# Patient Record
Sex: FEMALE | Race: WHITE | NOT HISPANIC OR LATINO
[De-identification: names, ages, dates, MRNs, and addresses within clinical notes are randomized per-mention and may not be internally consistent; named-entity substitution may affect disease eponyms.]

---

## 2017-07-18 ENCOUNTER — APPOINTMENT (OUTPATIENT)
Dept: MAMMOGRAPHY | Facility: IMAGING CENTER | Age: 47
End: 2017-07-18

## 2017-07-27 ENCOUNTER — FORM ENCOUNTER (OUTPATIENT)
Age: 47
End: 2017-07-27

## 2017-07-28 ENCOUNTER — APPOINTMENT (OUTPATIENT)
Dept: ULTRASOUND IMAGING | Facility: IMAGING CENTER | Age: 47
End: 2017-07-28
Payer: COMMERCIAL

## 2017-07-28 ENCOUNTER — APPOINTMENT (OUTPATIENT)
Dept: MAMMOGRAPHY | Facility: IMAGING CENTER | Age: 47
End: 2017-07-28
Payer: COMMERCIAL

## 2017-07-28 ENCOUNTER — OUTPATIENT (OUTPATIENT)
Dept: OUTPATIENT SERVICES | Facility: HOSPITAL | Age: 47
LOS: 1 days | End: 2017-07-28
Payer: COMMERCIAL

## 2017-07-28 DIAGNOSIS — Z00.8 ENCOUNTER FOR OTHER GENERAL EXAMINATION: ICD-10-CM

## 2017-07-28 DIAGNOSIS — R92.2 INCONCLUSIVE MAMMOGRAM: ICD-10-CM

## 2017-07-28 PROCEDURE — 76641 ULTRASOUND BREAST COMPLETE: CPT | Mod: 26,50

## 2017-07-28 PROCEDURE — 76830 TRANSVAGINAL US NON-OB: CPT | Mod: 26

## 2017-07-28 PROCEDURE — 76856 US EXAM PELVIC COMPLETE: CPT

## 2017-07-28 PROCEDURE — 76856 US EXAM PELVIC COMPLETE: CPT | Mod: 26

## 2017-07-28 PROCEDURE — G0202: CPT | Mod: 26

## 2017-07-28 PROCEDURE — 76830 TRANSVAGINAL US NON-OB: CPT

## 2017-07-28 PROCEDURE — 77063 BREAST TOMOSYNTHESIS BI: CPT | Mod: 26

## 2017-07-28 PROCEDURE — 76641 ULTRASOUND BREAST COMPLETE: CPT

## 2017-07-28 PROCEDURE — 77063 BREAST TOMOSYNTHESIS BI: CPT

## 2017-07-28 PROCEDURE — 77067 SCR MAMMO BI INCL CAD: CPT

## 2017-09-12 ENCOUNTER — APPOINTMENT (OUTPATIENT)
Dept: OBGYN | Facility: CLINIC | Age: 47
End: 2017-09-12

## 2017-12-01 ENCOUNTER — APPOINTMENT (OUTPATIENT)
Dept: OBGYN | Facility: CLINIC | Age: 47
End: 2017-12-01
Payer: COMMERCIAL

## 2017-12-01 VITALS
DIASTOLIC BLOOD PRESSURE: 77 MMHG | BODY MASS INDEX: 25.45 KG/M2 | WEIGHT: 158.38 LBS | HEIGHT: 66 IN | SYSTOLIC BLOOD PRESSURE: 123 MMHG

## 2017-12-01 DIAGNOSIS — Z01.419 ENCOUNTER FOR GYNECOLOGICAL EXAMINATION (GENERAL) (ROUTINE) W/OUT ABNORMAL FINDINGS: ICD-10-CM

## 2017-12-01 PROCEDURE — 99396 PREV VISIT EST AGE 40-64: CPT

## 2017-12-04 LAB
C TRACH RRNA SPEC QL NAA+PROBE: NOT DETECTED
N GONORRHOEA RRNA SPEC QL NAA+PROBE: NOT DETECTED
SOURCE AMPLIFICATION: NORMAL

## 2019-02-06 ENCOUNTER — INPATIENT (INPATIENT)
Facility: HOSPITAL | Age: 49
LOS: 1 days | Discharge: HOME | DRG: 387 | End: 2019-02-08
Attending: SURGERY | Admitting: SURGERY
Payer: COMMERCIAL

## 2019-02-06 VITALS
DIASTOLIC BLOOD PRESSURE: 85 MMHG | SYSTOLIC BLOOD PRESSURE: 136 MMHG | WEIGHT: 158.07 LBS | RESPIRATION RATE: 16 BRPM | HEIGHT: 65 IN | OXYGEN SATURATION: 95 % | HEART RATE: 83 BPM | TEMPERATURE: 98 F

## 2019-02-06 DIAGNOSIS — K56.609 UNSPECIFIED INTESTINAL OBSTRUCTION, UNSPECIFIED AS TO PARTIAL VERSUS COMPLETE OBSTRUCTION: ICD-10-CM

## 2019-02-06 DIAGNOSIS — K56.609 UNSPECIFIED INTESTINAL OBSTRUCTION, UNSPECIFIED AS TO PARTIAL VERSUS COMPLETE OBSTRUCTION: Chronic | ICD-10-CM

## 2019-02-06 DIAGNOSIS — S37.69XA OTHER INJURY OF UTERUS, INITIAL ENCOUNTER: Chronic | ICD-10-CM

## 2019-02-06 DIAGNOSIS — Z90.49 ACQUIRED ABSENCE OF OTHER SPECIFIED PARTS OF DIGESTIVE TRACT: Chronic | ICD-10-CM

## 2019-02-06 LAB
ALBUMIN SERPL ELPH-MCNC: 4.6 G/DL — SIGNIFICANT CHANGE UP (ref 3.3–5)
ALP SERPL-CCNC: 65 U/L — SIGNIFICANT CHANGE UP (ref 40–120)
ALT FLD-CCNC: 13 U/L — SIGNIFICANT CHANGE UP (ref 10–45)
ANION GAP SERPL CALC-SCNC: 13 MMOL/L — SIGNIFICANT CHANGE UP (ref 5–17)
APPEARANCE UR: CLEAR — SIGNIFICANT CHANGE UP
APTT BLD: 30.6 SEC — SIGNIFICANT CHANGE UP (ref 27.5–36.3)
AST SERPL-CCNC: 16 U/L — SIGNIFICANT CHANGE UP (ref 10–40)
BASOPHILS # BLD AUTO: 0 K/UL — SIGNIFICANT CHANGE UP (ref 0–0.2)
BASOPHILS NFR BLD AUTO: 0.4 % — SIGNIFICANT CHANGE UP (ref 0–2)
BILIRUB SERPL-MCNC: 0.3 MG/DL — SIGNIFICANT CHANGE UP (ref 0.2–1.2)
BILIRUB UR-MCNC: NEGATIVE — SIGNIFICANT CHANGE UP
BLD GP AB SCN SERPL QL: NEGATIVE — SIGNIFICANT CHANGE UP
BUN SERPL-MCNC: 11 MG/DL — SIGNIFICANT CHANGE UP (ref 7–23)
CALCIUM SERPL-MCNC: 9.4 MG/DL — SIGNIFICANT CHANGE UP (ref 8.4–10.5)
CHLORIDE SERPL-SCNC: 103 MMOL/L — SIGNIFICANT CHANGE UP (ref 96–108)
CO2 SERPL-SCNC: 23 MMOL/L — SIGNIFICANT CHANGE UP (ref 22–31)
COLOR SPEC: SIGNIFICANT CHANGE UP
CREAT SERPL-MCNC: 0.69 MG/DL — SIGNIFICANT CHANGE UP (ref 0.5–1.3)
DIFF PNL FLD: NEGATIVE — SIGNIFICANT CHANGE UP
EOSINOPHIL # BLD AUTO: 0 K/UL — SIGNIFICANT CHANGE UP (ref 0–0.5)
EOSINOPHIL NFR BLD AUTO: 0.5 % — SIGNIFICANT CHANGE UP (ref 0–6)
GLUCOSE SERPL-MCNC: 106 MG/DL — HIGH (ref 70–99)
GLUCOSE UR QL: NEGATIVE — SIGNIFICANT CHANGE UP
HCG SERPL-ACNC: <2 MIU/ML — SIGNIFICANT CHANGE UP
HCT VFR BLD CALC: 41.4 % — SIGNIFICANT CHANGE UP (ref 34.5–45)
HGB BLD-MCNC: 14 G/DL — SIGNIFICANT CHANGE UP (ref 11.5–15.5)
INR BLD: 1.04 RATIO — SIGNIFICANT CHANGE UP (ref 0.88–1.16)
KETONES UR-MCNC: SIGNIFICANT CHANGE UP
LEUKOCYTE ESTERASE UR-ACNC: NEGATIVE — SIGNIFICANT CHANGE UP
LIDOCAIN IGE QN: 36 U/L — SIGNIFICANT CHANGE UP (ref 7–60)
LYMPHOCYTES # BLD AUTO: 1.2 K/UL — SIGNIFICANT CHANGE UP (ref 1–3.3)
LYMPHOCYTES # BLD AUTO: 16.6 % — SIGNIFICANT CHANGE UP (ref 13–44)
MCHC RBC-ENTMCNC: 28.7 PG — SIGNIFICANT CHANGE UP (ref 27–34)
MCHC RBC-ENTMCNC: 34 GM/DL — SIGNIFICANT CHANGE UP (ref 32–36)
MCV RBC AUTO: 84.6 FL — SIGNIFICANT CHANGE UP (ref 80–100)
MONOCYTES # BLD AUTO: 0.3 K/UL — SIGNIFICANT CHANGE UP (ref 0–0.9)
MONOCYTES NFR BLD AUTO: 4.3 % — SIGNIFICANT CHANGE UP (ref 2–14)
NEUTROPHILS # BLD AUTO: 5.7 K/UL — SIGNIFICANT CHANGE UP (ref 1.8–7.4)
NEUTROPHILS NFR BLD AUTO: 78.3 % — HIGH (ref 43–77)
NITRITE UR-MCNC: NEGATIVE — SIGNIFICANT CHANGE UP
PH UR: 6.5 — SIGNIFICANT CHANGE UP (ref 5–8)
PLATELET # BLD AUTO: 240 K/UL — SIGNIFICANT CHANGE UP (ref 150–400)
POTASSIUM SERPL-MCNC: 3.7 MMOL/L — SIGNIFICANT CHANGE UP (ref 3.5–5.3)
POTASSIUM SERPL-SCNC: 3.7 MMOL/L — SIGNIFICANT CHANGE UP (ref 3.5–5.3)
PROT SERPL-MCNC: 7.8 G/DL — SIGNIFICANT CHANGE UP (ref 6–8.3)
PROT UR-MCNC: ABNORMAL
PROTHROM AB SERPL-ACNC: 12 SEC — SIGNIFICANT CHANGE UP (ref 10–12.9)
RBC # BLD: 4.89 M/UL — SIGNIFICANT CHANGE UP (ref 3.8–5.2)
RBC # FLD: 12.4 % — SIGNIFICANT CHANGE UP (ref 10.3–14.5)
RH IG SCN BLD-IMP: POSITIVE — SIGNIFICANT CHANGE UP
SODIUM SERPL-SCNC: 139 MMOL/L — SIGNIFICANT CHANGE UP (ref 135–145)
SP GR SPEC: 1.02 — SIGNIFICANT CHANGE UP (ref 1.01–1.02)
UROBILINOGEN FLD QL: NEGATIVE — SIGNIFICANT CHANGE UP
WBC # BLD: 7.3 K/UL — SIGNIFICANT CHANGE UP (ref 3.8–10.5)
WBC # FLD AUTO: 7.3 K/UL — SIGNIFICANT CHANGE UP (ref 3.8–10.5)

## 2019-02-06 PROCEDURE — 93010 ELECTROCARDIOGRAM REPORT: CPT | Mod: 59

## 2019-02-06 PROCEDURE — 74177 CT ABD & PELVIS W/CONTRAST: CPT | Mod: 26

## 2019-02-06 PROCEDURE — 71045 X-RAY EXAM CHEST 1 VIEW: CPT | Mod: 26

## 2019-02-06 PROCEDURE — 99285 EMERGENCY DEPT VISIT HI MDM: CPT | Mod: 25

## 2019-02-06 RX ORDER — CIPROFLOXACIN LACTATE 400MG/40ML
400 VIAL (ML) INTRAVENOUS ONCE
Qty: 0 | Refills: 0 | Status: COMPLETED | OUTPATIENT
Start: 2019-02-06 | End: 2019-02-06

## 2019-02-06 RX ORDER — CIPROFLOXACIN LACTATE 400MG/40ML
VIAL (ML) INTRAVENOUS
Qty: 0 | Refills: 0 | Status: DISCONTINUED | OUTPATIENT
Start: 2019-02-06 | End: 2019-02-08

## 2019-02-06 RX ORDER — FAMOTIDINE 10 MG/ML
20 INJECTION INTRAVENOUS ONCE
Qty: 0 | Refills: 0 | Status: COMPLETED | OUTPATIENT
Start: 2019-02-06 | End: 2019-02-06

## 2019-02-06 RX ORDER — ACETAMINOPHEN 500 MG
1000 TABLET ORAL ONCE
Qty: 0 | Refills: 0 | Status: COMPLETED | OUTPATIENT
Start: 2019-02-06 | End: 2019-02-06

## 2019-02-06 RX ORDER — SODIUM CHLORIDE 9 MG/ML
1000 INJECTION INTRAMUSCULAR; INTRAVENOUS; SUBCUTANEOUS
Qty: 0 | Refills: 0 | Status: DISCONTINUED | OUTPATIENT
Start: 2019-02-06 | End: 2019-02-08

## 2019-02-06 RX ORDER — CIPROFLOXACIN LACTATE 400MG/40ML
400 VIAL (ML) INTRAVENOUS EVERY 12 HOURS
Qty: 0 | Refills: 0 | Status: DISCONTINUED | OUTPATIENT
Start: 2019-02-07 | End: 2019-02-08

## 2019-02-06 RX ORDER — METRONIDAZOLE 500 MG
500 TABLET ORAL EVERY 8 HOURS
Qty: 0 | Refills: 0 | Status: DISCONTINUED | OUTPATIENT
Start: 2019-02-07 | End: 2019-02-08

## 2019-02-06 RX ORDER — METRONIDAZOLE 500 MG
500 TABLET ORAL ONCE
Qty: 0 | Refills: 0 | Status: COMPLETED | OUTPATIENT
Start: 2019-02-06 | End: 2019-02-06

## 2019-02-06 RX ORDER — ONDANSETRON 8 MG/1
4 TABLET, FILM COATED ORAL ONCE
Qty: 0 | Refills: 0 | Status: COMPLETED | OUTPATIENT
Start: 2019-02-06 | End: 2019-02-06

## 2019-02-06 RX ORDER — ENOXAPARIN SODIUM 100 MG/ML
40 INJECTION SUBCUTANEOUS DAILY
Qty: 0 | Refills: 0 | Status: DISCONTINUED | OUTPATIENT
Start: 2019-02-06 | End: 2019-02-08

## 2019-02-06 RX ORDER — METRONIDAZOLE 500 MG
TABLET ORAL
Qty: 0 | Refills: 0 | Status: DISCONTINUED | OUTPATIENT
Start: 2019-02-06 | End: 2019-02-08

## 2019-02-06 RX ORDER — SODIUM CHLORIDE 9 MG/ML
1000 INJECTION INTRAMUSCULAR; INTRAVENOUS; SUBCUTANEOUS ONCE
Qty: 0 | Refills: 0 | Status: COMPLETED | OUTPATIENT
Start: 2019-02-06 | End: 2019-02-06

## 2019-02-06 RX ADMIN — SODIUM CHLORIDE 125 MILLILITER(S): 9 INJECTION INTRAMUSCULAR; INTRAVENOUS; SUBCUTANEOUS at 15:50

## 2019-02-06 RX ADMIN — SODIUM CHLORIDE 500 MILLILITER(S): 9 INJECTION INTRAMUSCULAR; INTRAVENOUS; SUBCUTANEOUS at 11:29

## 2019-02-06 RX ADMIN — SODIUM CHLORIDE 1000 MILLILITER(S): 9 INJECTION INTRAMUSCULAR; INTRAVENOUS; SUBCUTANEOUS at 13:39

## 2019-02-06 RX ADMIN — Medication 200 MILLIGRAM(S): at 20:38

## 2019-02-06 RX ADMIN — Medication 1000 MILLIGRAM(S): at 23:15

## 2019-02-06 RX ADMIN — Medication 1000 MILLIGRAM(S): at 12:30

## 2019-02-06 RX ADMIN — Medication 400 MILLIGRAM(S): at 11:28

## 2019-02-06 RX ADMIN — ONDANSETRON 4 MILLIGRAM(S): 8 TABLET, FILM COATED ORAL at 11:28

## 2019-02-06 RX ADMIN — FAMOTIDINE 20 MILLIGRAM(S): 10 INJECTION INTRAVENOUS at 11:28

## 2019-02-06 RX ADMIN — Medication 1000 MILLIGRAM(S): at 12:38

## 2019-02-06 RX ADMIN — Medication 100 MILLIGRAM(S): at 18:31

## 2019-02-06 RX ADMIN — Medication 400 MILLIGRAM(S): at 22:45

## 2019-02-06 NOTE — H&P ADULT - ASSESSMENT
Jesica Padgett is a 48 y.o. woman with history of Crohn's Disease (not on any medication, has taken steroids in the past), laparoscopic appendectomy c/b SBO, which resolved with medical management only, and uterine rupture requiring operative repair (appendectomy, SBO, and uterine rupture all occurred during the same pregnancy) who presents to the ED with 1 day of abdominal pain with cessation of GI function.    Found to have a high-grade SBO with transition point in the distal ileum and proximally distended small bowel with fecalization.     Plan:  - NPO + IVF  - Serial abdominal exams  - Monitor for return of GIF  - Will discuss medical treatment of Crohn's with GI, Dr. Aguilera ([phone])  - Plan discussed with Dr. Gibson Myers, PGY2  [phone]

## 2019-02-06 NOTE — H&P ADULT - HISTORY OF PRESENT ILLNESS
Jesica Padgett is a 48 y.o. woman with history of Crohn's Disease (not on any medication, has taken steroids in the past), laparoscopic appendectomy c/b SBO, which resolved with medical management only, and uterine rupture requiring operative repair (appendectomy, SBO, and uterine rupture all occurred during the same pregnancy) who presents to the ED with 1 day of abdominal pain with cessation of GI function.     The patient describes her pain as intermittent, constant in severity, "dull" or "burning," and localized to the right side of her abdomen. The patient states that the pain will begin in her RLQ and radiate superiorly. Last flatus was last night. Last BM was also last night and was normal.     The patient states that she has had some associated nausea, but denies any vomiting, fever, chills, anorexia, belching, constipation or diarrhea.     The patient was finally prompted to come into the ED due to the continuation of her pain.

## 2019-02-06 NOTE — ED PROVIDER NOTE - OBJECTIVE STATEMENT
Otherwise healthy 47 y/o female but c/ h/o uterine rupture and bowel obstruction during pregnancy as well as being s/p appendectomy who presents c/ a cc of AP since last night: epigastric, constant, dull to sharp, waxing and waning in intensity, exacerbated by walking around and perhaps laying down but relieved to a degree by laying on her side and a/w nausea, belching and perhaps some sense of heaviness in her suprapubic region s/ dysuria, urgency, frequency, hematuria, vaginal bleeding or discharge.

## 2019-02-06 NOTE — ED ADULT NURSE NOTE - OBJECTIVE STATEMENT
47 y/o Female presenting to the ED ambulatory, A&Ox3, complaining of abdominal pain since last night. pt hx of chrones, SBO, and uterine rupture. Pt shx of Essure implants in the fallopian tubes. LMP 1/22/19. Abdomen soft, slightly distended, tender upon palpation, bowel sounds present in all four quadrants. Lung sounds clear. Heart sounds WNL. No edema noted. Pt reports passing gas, and had a normal baseline bowel movement last night. Pt reports dull 3/10 pain constantly and 8/10 pain intermittently while moving or laying down. Pt denies chest pain, shortness of breath, back pain, hematuria, dysuria, blood in the stool, cough, dizziness, headache, vomiting. Safety and comfort measures provided. Family at bedside. Pt aware of plan of care.

## 2019-02-06 NOTE — ED ADULT NURSE REASSESSMENT NOTE - NS ED NURSE REASSESS COMMENT FT1
Pt drinking oral contrast for CT. Pt reports intermittently her pain becomes an 8/10 but currently it is a 3/10. MD aware. IV tylenol to be given. Pt aware of plan of care. Safety and comfort measures provided. Family at bedside

## 2019-02-06 NOTE — ED PROVIDER NOTE - PROGRESS NOTE DETAILS
high grade bowel obstruction on CT. Dr. Aguilera(GI) notified, requested Mati Arreaga(surgery) be notified. Surgery notified, will come see patient. Patient resting comfortably, no vomiting. Evaluated by Gen Surg. Anticipate admission to their service. Still remains comfortable and appears well.

## 2019-02-06 NOTE — ED ADULT NURSE REASSESSMENT NOTE - NS ED NURSE REASSESS COMMENT FT1
Report received from Pepe THEODORE, VS repeated. Patient resting comfortably, denies chest pain/SOB/pain. Pt pending second antibiotic

## 2019-02-06 NOTE — ED ADULT NURSE REASSESSMENT NOTE - NS ED NURSE REASSESS COMMENT FT1
Pt aware of admission. Clicked ready to move. 5000 bracelet applied. Pt reporting 2/10 pain, and states it is tolerable. Pt states she feels thirsty and requests fluids. Maintenance fluids to be ordered by MD. Safety and comfort measures provided. Pt aware of plan of care. Call bell within reach.

## 2019-02-06 NOTE — ED ADULT NURSE REASSESSMENT NOTE - NS ED NURSE REASSESS COMMENT FT1
Pt reports feeling like the pain is almost completely gone. Pt reports feeling so much better. Flagyl infusing. Pt aware of plan of care. Awaiting admitted bed.

## 2019-02-06 NOTE — ED PROVIDER NOTE - GASTROINTESTINAL, MLM
Abdomen soft, non-focal discomfort in epigastrium and on R even when palpating on L s/ Bauman's, no guarding but a bit of distention and rebound.

## 2019-02-06 NOTE — CHART NOTE - NSCHARTNOTEFT_GEN_A_CORE
GENERAL SURGERY DAILY PROGRESS NOTE:       Subjective:  Pt seen and examined at bedside. Denies N/V. Endorses very mild abdominal pain beginning in the periumbilical region radiating up towards the epigastrium, much improved from the pain earlier in the day. Passing flatus regularly, no BM.         Objective:    PE:  Gen: resting comfortably in bed, NAD  Resp: breathing comfortably  Abd: soft, NT, mildly distended. No rebound/guarding     Vital Signs Last 24 Hrs  T(C): 36.6 (2019 22:02), Max: 37.4 (2019 20:53)  T(F): 97.9 (2019 22:02), Max: 99.4 (2019 20:53)  HR: 65 (2019 22:02) (65 - 83)  BP: 124/79 (2019 22:02) (119/75 - 136/85)  BP(mean): --  RR: 18 (2019 22:02) (16 - 18)  SpO2: 96% (2019 22:02) (95% - 100%)    I&O's Detail      Daily Height in cm: 165.1 (2019 09:26)    Daily     MEDICATIONS  (STANDING):  acetaminophen  IVPB .. 1000 milliGRAM(s) IV Intermittent once  ciprofloxacin   IVPB      enoxaparin Injectable 40 milliGRAM(s) SubCutaneous daily  metroNIDAZOLE  IVPB      sodium chloride 0.9%. 1000 milliLiter(s) (125 mL/Hr) IV Continuous <Continuous>    MEDICATIONS  (PRN):      LABS:                        14.0   7.3   )-----------( 240      ( 2019 11:03 )             41.4     02-06    139  |  103  |  11  ----------------------------<  106<H>  3.7   |  23  |  0.69    Ca    9.4      2019 11:03    TPro  7.8  /  Alb  4.6  /  TBili  0.3  /  DBili  x   /  AST  16  /  ALT  13  /  AlkPhos  65  02-06    PT/INR - ( 2019 14:27 )   PT: 12.0 sec;   INR: 1.04 ratio         PTT - ( 2019 14:27 )  PTT:30.6 sec  Urinalysis Basic - ( 2019 11:50 )    Color: Light Yellow / Appearance: Clear / S.023 / pH: x  Gluc: x / Ketone: Trace  / Bili: Negative / Urobili: Negative   Blood: x / Protein: Trace / Nitrite: Negative   Leuk Esterase: Negative / RBC: x / WBC x   Sq Epi: x / Non Sq Epi: x / Bacteria: x        RADIOLOGY & ADDITIONAL STUDIES:      48 y.o. woman with history of Crohn's Disease (not on any medication, has taken steroids in the past), laparoscopic appendectomy c/b SBO, which resolved with medical management only, and uterine rupture requiring operative repair (appendectomy, SBO, and uterine rupture all occurred during the same pregnancy) who presents to the ED with 1 day of abdominal pain with cessation of GI function. Found to have a high-grade SBO with transition point in the distal ileum and proximally distended small bowel with fecalization.     Plan:  - NPO + IVF  - Serial abdominal exams  - Monitor GI fxn     Red surgery   [id number]

## 2019-02-06 NOTE — ED ADULT NURSE REASSESSMENT NOTE - NS ED NURSE REASSESS COMMENT FT1
Pt returned from CT scan. Pt reports her pain feels a lot better. Her constant pain went from a 3/10 to a 1 or 2/10. Her intermittent pain went from a 8/10 to a 5/10. Pt fluids finished. Pt reports feeling slightly better. Pt brought to the bathroom. Safety and comfort measures provided. Family at bedside. Awaiting CT results.

## 2019-02-06 NOTE — H&P ADULT - NSHPPHYSICALEXAM_GEN_ALL_CORE
General: NAD, AOx3  CV: normotensive, regular rate  Pulm: nonlabored  Abd: soft, mildly distended, mild RLQ tenderness without rebound or guarding

## 2019-02-06 NOTE — ED PROVIDER NOTE - MEDICAL DECISION MAKING DETAILS
Appears well. AP since last night. Abdomen soft c/ non-focal ttp perhaps greatest in epigastrium and on R but not RUQ. Full investigation initiated. Analgesia, anti-emetic and IVF provided. Will follow her studies, reassess and treat/dispo accordingly.

## 2019-02-06 NOTE — H&P ADULT - NSHPLABSRESULTS_GEN_ALL_CORE
CBC (02-06 @ 11:03)                              14.0                           7.3     )----------------(  240        78.3<H>% Neutrophils, 16.6  % Lymphocytes, ANC: 5.7                                 41.4                  BMP (02-06 @ 11:03)             139     |  103     |  11    		Ca++ --      Ca 9.4                ---------------------------------( 106<H>		Mg --                 3.7     |  23      |  0.69  			Ph --        LFTs (02-06 @ 11:03)      TPro 7.8 / Alb 4.6 / TBili 0.3 / DBili -- / AST 16 / ALT 13 / AlkPhos 65    Coags (02-06 @ 14:27)  aPTT 30.6 / INR 1.04 / PT 12.0    CT Abdomen and Pelvis w/ Oral Cont and w/ IV Cont (02.06.19 @ 12:30)     LOWER CHEST: Within normal limits.    LIVER: Subcentimeter liver lesions are formally too small to   characterize, likely cysts. The liver is enlarged.  BILE DUCTS: Normal caliber.  GALLBLADDER: Within normal limits.  SPLEEN: Within normal limits.  PANCREAS: Within normal limits.  ADRENALS: Within normal limits.  KIDNEYS/URETERS: Mild bilateral hydronephrosis without evidence of   obstructing stone. Kidneys are otherwise unremarkable.    BLADDER: Within normal limits.  REPRODUCTIVE ORGANS: Bilateral Essuredevices are noted. The uterus and   bilateral adnexa are otherwise unremarkable.    BOWEL: There is prominent distention of the distal small bowel with fluid   and debris to the level of a transition point in the region of the distal   ileum in the right lower quadrant. Wall thickening, mesenteric stranding,   and enhancement is noted in the region of the transition point which may   be inflammatory in etiology. Underlying mass is not excluded. Appendix is   not visualized.  PERITONEUM: Small amount of free fluid in the pelvis.  VESSELS:  Within normal limits.  RETROPERITONEUM: No lymphadenopathy.    ABDOMINAL WALL: Healed postoperative changes.  BONES: Within normal limits.    IMPRESSION:   *  High-grade small bowel obstruction with transition point in the region   of the distal ileum.  *  Small amount of free fluid in the pelvis.  *  No free intraperitoneal air.

## 2019-02-07 DIAGNOSIS — B36.9 SUPERFICIAL MYCOSIS, UNSPECIFIED: ICD-10-CM

## 2019-02-07 LAB
ANION GAP SERPL CALC-SCNC: 10 MMOL/L — SIGNIFICANT CHANGE UP (ref 5–17)
APTT BLD: 29.1 SEC — SIGNIFICANT CHANGE UP (ref 27.5–36.3)
BLD GP AB SCN SERPL QL: NEGATIVE — SIGNIFICANT CHANGE UP
BUN SERPL-MCNC: 8 MG/DL — SIGNIFICANT CHANGE UP (ref 7–23)
CALCIUM SERPL-MCNC: 8.3 MG/DL — LOW (ref 8.4–10.5)
CHLORIDE SERPL-SCNC: 107 MMOL/L — SIGNIFICANT CHANGE UP (ref 96–108)
CO2 SERPL-SCNC: 23 MMOL/L — SIGNIFICANT CHANGE UP (ref 22–31)
CREAT SERPL-MCNC: 0.7 MG/DL — SIGNIFICANT CHANGE UP (ref 0.5–1.3)
GLUCOSE SERPL-MCNC: 95 MG/DL — SIGNIFICANT CHANGE UP (ref 70–99)
HCT VFR BLD CALC: 35.5 % — SIGNIFICANT CHANGE UP (ref 34.5–45)
HGB BLD-MCNC: 11.4 G/DL — LOW (ref 11.5–15.5)
INR BLD: 1.15 RATIO — SIGNIFICANT CHANGE UP (ref 0.88–1.16)
MAGNESIUM SERPL-MCNC: 2.2 MG/DL — SIGNIFICANT CHANGE UP (ref 1.6–2.6)
MCHC RBC-ENTMCNC: 27.8 PG — SIGNIFICANT CHANGE UP (ref 27–34)
MCHC RBC-ENTMCNC: 32.1 GM/DL — SIGNIFICANT CHANGE UP (ref 32–36)
MCV RBC AUTO: 86.6 FL — SIGNIFICANT CHANGE UP (ref 80–100)
PHOSPHATE SERPL-MCNC: 2.2 MG/DL — LOW (ref 2.5–4.5)
PLATELET # BLD AUTO: 165 K/UL — SIGNIFICANT CHANGE UP (ref 150–400)
POTASSIUM SERPL-MCNC: 3.6 MMOL/L — SIGNIFICANT CHANGE UP (ref 3.5–5.3)
POTASSIUM SERPL-SCNC: 3.6 MMOL/L — SIGNIFICANT CHANGE UP (ref 3.5–5.3)
PROTHROM AB SERPL-ACNC: 12.9 SEC — SIGNIFICANT CHANGE UP (ref 10–13.1)
RBC # BLD: 4.1 M/UL — SIGNIFICANT CHANGE UP (ref 3.8–5.2)
RBC # FLD: 14.1 % — SIGNIFICANT CHANGE UP (ref 10.3–14.5)
RH IG SCN BLD-IMP: POSITIVE — SIGNIFICANT CHANGE UP
SODIUM SERPL-SCNC: 140 MMOL/L — SIGNIFICANT CHANGE UP (ref 135–145)
WBC # BLD: 4.3 K/UL — SIGNIFICANT CHANGE UP (ref 3.8–10.5)
WBC # FLD AUTO: 4.3 K/UL — SIGNIFICANT CHANGE UP (ref 3.8–10.5)

## 2019-02-07 PROCEDURE — 69210 REMOVE IMPACTED EAR WAX UNI: CPT

## 2019-02-07 PROCEDURE — 99223 1ST HOSP IP/OBS HIGH 75: CPT

## 2019-02-07 PROCEDURE — 99254 IP/OBS CNSLTJ NEW/EST MOD 60: CPT | Mod: 25

## 2019-02-07 RX ORDER — ACETAMINOPHEN 500 MG
1000 TABLET ORAL ONCE
Qty: 0 | Refills: 0 | Status: COMPLETED | OUTPATIENT
Start: 2019-02-07 | End: 2019-02-07

## 2019-02-07 RX ORDER — POTASSIUM PHOSPHATE, MONOBASIC POTASSIUM PHOSPHATE, DIBASIC 236; 224 MG/ML; MG/ML
15 INJECTION, SOLUTION INTRAVENOUS ONCE
Qty: 0 | Refills: 0 | Status: COMPLETED | OUTPATIENT
Start: 2019-02-07 | End: 2019-02-07

## 2019-02-07 RX ADMIN — POTASSIUM PHOSPHATE, MONOBASIC POTASSIUM PHOSPHATE, DIBASIC 62.5 MILLIMOLE(S): 236; 224 INJECTION, SOLUTION INTRAVENOUS at 14:47

## 2019-02-07 RX ADMIN — Medication 100 MILLIGRAM(S): at 05:17

## 2019-02-07 RX ADMIN — Medication 1000 MILLIGRAM(S): at 16:55

## 2019-02-07 RX ADMIN — Medication 400 MILLIGRAM(S): at 08:37

## 2019-02-07 RX ADMIN — Medication 200 MILLIGRAM(S): at 05:18

## 2019-02-07 RX ADMIN — SODIUM CHLORIDE 125 MILLILITER(S): 9 INJECTION INTRAMUSCULAR; INTRAVENOUS; SUBCUTANEOUS at 08:37

## 2019-02-07 RX ADMIN — Medication 100 MILLIGRAM(S): at 21:38

## 2019-02-07 RX ADMIN — ENOXAPARIN SODIUM 40 MILLIGRAM(S): 100 INJECTION SUBCUTANEOUS at 14:49

## 2019-02-07 RX ADMIN — Medication 200 MILLIGRAM(S): at 17:35

## 2019-02-07 RX ADMIN — Medication 1000 MILLIGRAM(S): at 08:52

## 2019-02-07 RX ADMIN — Medication 100 MILLIGRAM(S): at 14:46

## 2019-02-07 RX ADMIN — Medication 400 MILLIGRAM(S): at 16:40

## 2019-02-07 NOTE — PROGRESS NOTE ADULT - ASSESSMENT
Assessment: 48 y.o. woman with history of Crohn's Disease (not on any medication, has taken steroids in the past), laparoscopic appendectomy c/b SBO, which resolved with medical management only, and uterine rupture requiring operative repair (appendectomy, SBO, and uterine rupture all occurred during the same pregnancy)  p/w high-grade SBO with transition point in the distal ileum and proximally distended small bowel with fecalization.     Plan:  - NPO + IVF  - Serial abdominal exams  - Monitor for return of GIF  - Cont Cipro/Flagyl  - Appreciate GI recs: cont abx, trial of clears, iv steroids if worsening symptoms, outpt f/u  - DVT PPX  - AM Labs    Red Surgery   [id number]

## 2019-02-07 NOTE — CONSULT NOTE ADULT - SUBJECTIVE AND OBJECTIVE BOX
SUBJECTIVE:  48yFemale with long standing Crohn's ileocolitis, not on therapy x 8 years, who presents with increasing abdominal pain, distention, nausea and loss of flatus/BMs.  Imaging in ER confirmed a small bowel obstruction due to active inflammatory disease.  She is presently feeling much better.  Last passed flatus yesterday evening.  No nausea or vomiting.  minimal if any abdominal pain without pain meds.  Denies fever or chills.  Recalls that, in retrospect, she would feel unwell after overeating (bloated, nauseous) for a while, but as all symptoms would resolve by the morning she did not pay it too much attention.  No recent weight loss.  ______________________________________________________________________  PMH/PSH:  PAST MEDICAL & SURGICAL HISTORY:  Crohn disease  Uterine rupture  Bowel obstruction  History of appendectomy    ______________________________________________________________________  MEDS:  MEDICATIONS  (STANDING):  ciprofloxacin   IVPB 400 milliGRAM(s) IV Intermittent every 12 hours  ciprofloxacin   IVPB      enoxaparin Injectable 40 milliGRAM(s) SubCutaneous daily  metroNIDAZOLE  IVPB      metroNIDAZOLE  IVPB 500 milliGRAM(s) IV Intermittent every 8 hours  sodium chloride 0.9%. 1000 milliLiter(s) (125 mL/Hr) IV Continuous <Continuous>    MEDICATIONS  (PRN):    ______________________________________________________________________  ALL:   Allergies    No Known Allergies    Intolerances      ______________________________________________________________________  SH: no toxic habits, lives with kids and  (peds in [place])  ______________________________________________________________________  FH:  FAMILY HISTORY:   two of her kids have Crohn's (one on Remicade, one on Humira)  ______________________________________________________________________  ROS:    CONSTITUTIONAL:  No weight loss, fever, chills, weakness or fatigue.    HEENT:  Eyes:  No visual loss, blurred vision, double vision or yellow sclerae. Ears, Nose, Throat:  No hearing loss, sneezing, congestion, runny nose or sore throat.    SKIN:  No rash or itching.    CARDIOVASCULAR:  No chest pain, chest pressure or chest discomfort. No palpitations or edema.    RESPIRATORY:  No shortness of breath, cough or sputum.    GASTROINTESTINAL:  SEE HPI    GENITOURINARY:  No dysuria, hematuria, urinary frequency    NEUROLOGICAL:  No headache, dizziness, syncope, paralysis, ataxia, numbness or tingling in the extremities. No change in bowel or bladder control.    MUSCULOSKELETAL:  No muscle, back pain, joint pain or stiffness.    HEMATOLOGIC:  No anemia, bleeding or bruising.    LYMPHATICS:  No enlarged nodes. No history of splenectomy.    PSYCHIATRIC:  No history of depression or anxiety.    ENDOCRINOLOGIC:  No reports of sweating, cold or heat intolerance. No polyuria or polydipsia.    ALLERGIES:  No history of asthma, hives, eczema or rhinitis.  ______________________________________________________________________  PHYSICAL EXAM:  T(C): 37.1 (02-07-19 @ 06:33), Max: 37.4 (02-06-19 @ 20:53)  HR: 66 (02-07-19 @ 06:33)  BP: 113/73 (02-07-19 @ 06:33)  RR: 18 (02-07-19 @ 06:33)  SpO2: 95% (02-07-19 @ 06:33)  Wt(kg): --    02-06 - 02-07  --------------------------------------------------------  IN:    sodium chloride 0.9%.: 1500 mL    Solution: 100 mL    Solution: 100 mL    Solution: 250 mL  Total IN: 1950 mL    OUT:    Voided: 400 mL  Total OUT: 400 mL    Total NET: 1550 mL        PHYSICAL EXAM:      Constitutional: nad    Eyes: perrla, no uveitis    ENMT: mmm    Neck: supple    Respiratory: cta    Cardiovascular: rr, s1/s2 nl    Gastrointestinal: soft nd minimal RLQ ttp +bs    Extremities: no c/c    Neurological: grossly non-focal    Skin: no rashes/EN/PG    Psychiatric: a&o x 3      ______________________________________________________________________  LABS:                        14.0   7.3   )-----------( 240      ( 06 Feb 2019 11:03 )             41.4     02-07    140  |  107  |  8   ----------------------------<  95  3.6   |  23  |  0.70    Ca    8.3<L>      07 Feb 2019 07:27  Phos  2.2     02-07  Mg     2.2     02-07    TPro  7.8  /  Alb  4.6  /  TBili  0.3  /  DBili  x   /  AST  16  /  ALT  13  /  AlkPhos  65  02-06    LIVER FUNCTIONS - ( 06 Feb 2019 11:03 )  Alb: 4.6 g/dL / Pro: 7.8 g/dL / ALK PHOS: 65 U/L / ALT: 13 U/L / AST: 16 U/L / GGT: x           ______________________________________________________________________  IMAGING:  EXAM:  CT ABDOMEN AND PELVIS OC IC                        PROCEDURE DATE:  02/06/2019      INTERPRETATION:  CLINICAL INFORMATION: 48-year-old female with abdominal pain. History of small bowel obstruction.         COMPARISON: CT scan 3/29/2011    PROCEDURE:   CT of the Abdomen and Pelvis was performed with intravenous contrast. Intravenous contrast: 90 ml Omnipaque 350. 10 ml discarded. Oral contrast: None. Sagittal and coronal reformats were performed.    FINDINGS:    LOWER CHEST: Within normal limits.    LIVER: Subcentimeter liver lesions are formally too small to characterize, likely cysts. The liver is enlarged.  BILE DUCTS: Normal caliber.  GALLBLADDER: Within normal limits.  SPLEEN: Within normal limits.  PANCREAS: Within normal limits.  ADRENALS: Within normal limits.  KIDNEYS/URETERS: Mild bilateral hydronephrosis without evidence of obstructing stone. Kidneys are otherwise unremarkable.    BLADDER: Within normal limits.  REPRODUCTIVE ORGANS: Bilateral Essure devices are noted. The uterus and bilateral adnexa are otherwise unremarkable.    BOWEL: There is prominent distention of the distal small bowel with fluid and debris to the level of a transition point in the region of the distal ileum in the right lower quadrant. Wall thickening, mesenteric stranding, and enhancement is noted in the region of the transition point which may be inflammatory in etiology. Underlying mass is not excluded. Appendix is not visualized.    PERITONEUM: Small amount of free fluid in the pelvis.  VESSELS:  Within normal limits.  RETROPERITONEUM: No lymphadenopathy.    ABDOMINAL WALL: Healed postoperative changes.  BONES: Within normal limits.    ______________________________________________________________________  ASSESSMENT:  48y Female with high grade SBO likely due to inflammatory Crohn's ileitis, clinically improved on antibiotics.    PLAN:  1.  Continue IV Cipro/Flagyl  2.  Consider trial of clear liquids  3.  If symptoms worsen would need to start steroids - SoluMedrol 20 mg IV q8h - but ideally would try to avoid  4.  Will need to address long term therapy, likely biologic, as outpatient as long as acute episode subsides  5.  As outpatient will obtain short term enterography to r/o underlying mass    Mark Rome M.D.  [facility]  O) [phone]

## 2019-02-07 NOTE — PROGRESS NOTE ADULT - SUBJECTIVE AND OBJECTIVE BOX
COLORECTAL SURGERY DAILY PROGRESS NOTE    Subjective: Pt seen and examined at bedside. Admits to mild abd pain.  Passing flatus, No BM, N/V.    Vital Signs Last 24 Hrs  T(C): 36.7 (2019 10:11), Max: 37.4 (2019 20:53)  T(F): 98.1 (2019 10:11), Max: 99.4 (2019 20:53)  HR: 62 (2019 10:) (62 - 77)  BP: 114/70 (2019 10:11) (106/69 - 127/86)  BP(mean): --  RR: 18 (2019 10:) (16 - 18)  SpO2: 96% (2019 10:) (95% - 100%)    I&O's Summary    2019 07:01  -  2019 07:00  --------------------------------------------------------  IN: 1950 mL / OUT: 400 mL / NET: 1550 mL    2019 07:01  -  2019 11:57  --------------------------------------------------------  IN: 100 mL / OUT: 400 mL / NET: -300 mL          Physical Exam:  General Appearance: Appears well, NAD, A&OX3  Chest: Equal expansion bilaterally  Abdomen: Soft, NT, mild distension  Extremities: Grossly symmetric, SCD's in place     LABS:                        11.4   4.30  )-----------( 165      ( 2019 08:59 )             35.5     02-07    140  |  107  |  8   ----------------------------<  95  3.6   |  23  |  0.70    Ca    8.3<L>      2019 07:27  Phos  2.2     02-07  Mg     2.2     02-07    TPro  7.8  /  Alb  4.6  /  TBili  0.3  /  DBili  x   /  AST  16  /  ALT  13  /  AlkPhos  65  -    PT/INR - ( 2019 08:59 )   PT: 12.9 sec;   INR: 1.15 ratio         PTT - ( 2019 08:59 )  PTT:29.1 sec  Urinalysis Basic - ( 2019 11:50 )    Color: Light Yellow / Appearance: Clear / S.023 / pH: x  Gluc: x / Ketone: Trace  / Bili: Negative / Urobili: Negative   Blood: x / Protein: Trace / Nitrite: Negative   Leuk Esterase: Negative / RBC: x / WBC x   Sq Epi: x / Non Sq Epi: x / Bacteria: x        RADIOLOGY & ADDITIONAL STUDIES:

## 2019-02-07 NOTE — CONSULT NOTE ADULT - SUBJECTIVE AND OBJECTIVE BOX
CC: left ear pain     HPI: This is a 49 y/o female with PMHx of Crohn's Disease, admitted for high-grade SBO. ENT called for left ear pain. Patient reports about a month ago she has left ear pain and treated with antibiotics, as of 2 days ago the pain returned and she was noted to have a fungal ear infection (as per patient's  who is a pediatrician)         PAST MEDICAL & SURGICAL HISTORY:  Crohn disease  Uterine rupture  Bowel obstruction  History of appendectomy    Allergies    No Known Allergies    Intolerances      MEDICATIONS  (STANDING):  ciprofloxacin   IVPB 400 milliGRAM(s) IV Intermittent every 12 hours  ciprofloxacin   IVPB      enoxaparin Injectable 40 milliGRAM(s) SubCutaneous daily  metroNIDAZOLE  IVPB      metroNIDAZOLE  IVPB 500 milliGRAM(s) IV Intermittent every 8 hours  sodium chloride 0.9%. 1000 milliLiter(s) (125 mL/Hr) IV Continuous <Continuous>    MEDICATIONS  (PRN):      Social History: **??**    Family history: **??**    ROS:   ENT: all negative except as noted in HPI   CV: denies palpitations  Pulm: denies SOB, cough, hemoptysis  GI: denies change in apetite, indigestion, n/v  : denies pertinent urinary symptoms, urgency  Neuro: denies numbness/tingling, loss of sensation  Psych: denies anxiety  MS: denies muscle weakness, instability  Heme: denies easy bruising or bleeding  Endo: denies heat/cold intolerance, excessive sweating  Vascular: denies LE edema    Vital Signs Last 24 Hrs  T(C): 37.2 (07 Feb 2019 17:08), Max: 37.4 (06 Feb 2019 20:53)  T(F): 99 (07 Feb 2019 17:08), Max: 99.4 (06 Feb 2019 20:53)  HR: 71 (07 Feb 2019 17:08) (62 - 72)  BP: 125/82 (07 Feb 2019 17:08) (106/69 - 125/82)  BP(mean): --  RR: 18 (07 Feb 2019 17:08) (16 - 18)  SpO2: 95% (07 Feb 2019 17:08) (95% - 100%)                          11.4   4.30  )-----------( 165      ( 07 Feb 2019 08:59 )             35.5    02-07    140  |  107  |  8   ----------------------------<  95  3.6   |  23  |  0.70    Ca    8.3<L>      07 Feb 2019 07:27  Phos  2.2     02-07  Mg     2.2     02-07    TPro  7.8  /  Alb  4.6  /  TBili  0.3  /  DBili  x   /  AST  16  /  ALT  13  /  AlkPhos  65  02-06   PT/INR - ( 07 Feb 2019 08:59 )   PT: 12.9 sec;   INR: 1.15 ratio         PTT - ( 07 Feb 2019 08:59 )  PTT:29.1 sec    PHYSICAL EXAM:  Gen: NAD  Skin: No rashes, bruises, or lesions  Head: Normocephalic, Atraumatic  Face: no edema, erythema, or fluctuance. Parotid glands soft without mass  Eyes: no scleral injection  Ears: Right - ear canal clear, TM intact without effusion or erythema. No evidence of any fluid drainage. No mastoid tenderness, erythema, or ear bulging            Left - ear canal clear, TM intact without effusion or erythema. No evidence of any fluid drainage. No mastoid tenderness, erythema, or ear bulging  Nose: Nares bilaterally patent, no discharge  Mouth: No Stridor / Drooling / Trismus.  Mucosa moist, tongue/uvula midline, oropharynx clear  Neck: Flat, supple, no lymphadenopathy, trachea midline, no masses  Lymphatic: No lymphadenopathy  Resp: breathing easily, no stridor  CV: no peripheral edema/cyanosis  GI: nondistended   Peripheral vascular: no JVD or edema  Neuro: facial nerve intact, no facial droop        Diagnostic Nasal Endoscopy: (Scope #2 used)    Fiberoptic Indirect laryngoscopy:  (Scope #2 used)        IMAGING/ADDITIONAL STUDIES: CC: left ear pain     HPI: This is a 49 y/o female with PMHx of Crohn's Disease, admitted for high-grade SBO. ENT called for left ear pain. Patient reports about a month ago she had left ear pain and treated with antibiotics, as of 2 days ago the pain returned and she was noted to have a fungal ear infection (as per patient's  who is a pediatrician) and started on clotrimazole drops. She reports no fevers, chills, recent URIs,         PAST MEDICAL & SURGICAL HISTORY:  Crohn disease  Uterine rupture  Bowel obstruction  History of appendectomy    Allergies    No Known Allergies    Intolerances      MEDICATIONS  (STANDING):  ciprofloxacin   IVPB 400 milliGRAM(s) IV Intermittent every 12 hours  ciprofloxacin   IVPB      enoxaparin Injectable 40 milliGRAM(s) SubCutaneous daily  metroNIDAZOLE  IVPB      metroNIDAZOLE  IVPB 500 milliGRAM(s) IV Intermittent every 8 hours  sodium chloride 0.9%. 1000 milliLiter(s) (125 mL/Hr) IV Continuous <Continuous>    MEDICATIONS  (PRN):      Social History: **??**    Family history: **??**    ROS:   ENT: all negative except as noted in HPI   CV: denies palpitations  Pulm: denies SOB, cough, hemoptysis  GI: denies change in apetite, indigestion, n/v  : denies pertinent urinary symptoms, urgency  Neuro: denies numbness/tingling, loss of sensation  Psych: denies anxiety  MS: denies muscle weakness, instability  Heme: denies easy bruising or bleeding  Endo: denies heat/cold intolerance, excessive sweating  Vascular: denies LE edema    Vital Signs Last 24 Hrs  T(C): 37.2 (07 Feb 2019 17:08), Max: 37.4 (06 Feb 2019 20:53)  T(F): 99 (07 Feb 2019 17:08), Max: 99.4 (06 Feb 2019 20:53)  HR: 71 (07 Feb 2019 17:08) (62 - 72)  BP: 125/82 (07 Feb 2019 17:08) (106/69 - 125/82)  BP(mean): --  RR: 18 (07 Feb 2019 17:08) (16 - 18)  SpO2: 95% (07 Feb 2019 17:08) (95% - 100%)                          11.4   4.30  )-----------( 165      ( 07 Feb 2019 08:59 )             35.5    02-07    140  |  107  |  8   ----------------------------<  95  3.6   |  23  |  0.70    Ca    8.3<L>      07 Feb 2019 07:27  Phos  2.2     02-07  Mg     2.2     02-07    TPro  7.8  /  Alb  4.6  /  TBili  0.3  /  DBili  x   /  AST  16  /  ALT  13  /  AlkPhos  65  02-06   PT/INR - ( 07 Feb 2019 08:59 )   PT: 12.9 sec;   INR: 1.15 ratio         PTT - ( 07 Feb 2019 08:59 )  PTT:29.1 sec    PHYSICAL EXAM:  Gen: NAD  Skin: No rashes, bruises, or lesions  Head: Normocephalic, Atraumatic  Face: no edema, erythema, or fluctuance. Parotid glands soft without mass  Eyes: no scleral injection  Ears: Right - ear canal clear, TM intact without effusion or erythema. No evidence of any fluid drainage. No mastoid tenderness, erythema, or ear bulging            Left - ear canal clear, TM intact without effusion or erythema. No evidence of any fluid drainage. No mastoid tenderness, erythema, or ear bulging  Nose: Nares bilaterally patent, no discharge  Mouth: No Stridor / Drooling / Trismus.  Mucosa moist, tongue/uvula midline, oropharynx clear  Neck: Flat, supple, no lymphadenopathy, trachea midline, no masses  Lymphatic: No lymphadenopathy  Resp: breathing easily, no stridor  CV: no peripheral edema/cyanosis  GI: nondistended   Peripheral vascular: no JVD or edema  Neuro: facial nerve intact, no facial droop        Diagnostic Nasal Endoscopy: (Scope #2 used)    Fiberoptic Indirect laryngoscopy:  (Scope #2 used)        IMAGING/ADDITIONAL STUDIES: CC: left ear pain     HPI: This is a 49 y/o female with PMHx of Crohn's Disease, admitted for high-grade SBO. ENT called for left ear pain. Patient reports about a month ago she had left ear pain and treated with antibiotics, as of 2 days ago the pain returned and she was noted to have a fungal ear infection (as per patient's  who is a pediatrician) and started on clotrimazole drops. She reports no fevers, chills, recent URIs, no active leakage from ears, no dizziness or vertigo. Admits to a headache she usually gets from Flagyl.     PAST MEDICAL & SURGICAL HISTORY:  Crohn disease  Uterine rupture  Bowel obstruction  History of appendectomy    Allergies: No Known Allergies     MEDICATIONS  (STANDING):  ciprofloxacin   IVPB 400 milliGRAM(s) IV Intermittent every 12 hours  ciprofloxacin   IVPB      enoxaparin Injectable 40 milliGRAM(s) SubCutaneous daily  metroNIDAZOLE  IVPB      metroNIDAZOLE  IVPB 500 milliGRAM(s) IV Intermittent every 8 hours  sodium chloride 0.9%. 1000 milliLiter(s) (125 mL/Hr) IV Continuous <Continuous>    MEDICATIONS  (PRN):    Social History: Denies alcohol, tobacco, or illicit drug abuse.   Family history: Denies nay significant family history     ROS:   ENT: all negative except as noted in HPI   CV: denies palpitations  Pulm: denies SOB, cough, hemoptysis  GI: Crohn's disease   : denies pertinent urinary symptoms, urgency  Neuro: denies numbness/tingling, loss of sensation  Psych: denies anxiety  MS: denies muscle weakness, instability  Heme: denies easy bruising or bleeding  Endo: denies heat/cold intolerance, excessive sweating  Vascular: denies LE edema    Vital Signs Last 24 Hrs  T(C): 37.2 (07 Feb 2019 17:08), Max: 37.4 (06 Feb 2019 20:53)  T(F): 99 (07 Feb 2019 17:08), Max: 99.4 (06 Feb 2019 20:53)  HR: 71 (07 Feb 2019 17:08) (62 - 72)  BP: 125/82 (07 Feb 2019 17:08) (106/69 - 125/82)  BP(mean): --  RR: 18 (07 Feb 2019 17:08) (16 - 18)  SpO2: 95% (07 Feb 2019 17:08) (95% - 100%)                          11.4   4.30  )-----------( 165      ( 07 Feb 2019 08:59 )             35.5    02-07    140  |  107  |  8   ----------------------------<  95  3.6   |  23  |  0.70    Ca    8.3<L>      07 Feb 2019 07:27  Phos  2.2     02-07  Mg     2.2     02-07    TPro  7.8  /  Alb  4.6  /  TBili  0.3  /  DBili  x   /  AST  16  /  ALT  13  /  AlkPhos  65  02-06   PT/INR - ( 07 Feb 2019 08:59 )   PT: 12.9 sec;   INR: 1.15 ratio         PTT - ( 07 Feb 2019 08:59 )  PTT:29.1 sec    PHYSICAL EXAM:  Gen: NAD  Skin: No rashes, bruises, or lesions  Head: Normocephalic, Atraumatic  Face: no edema, erythema, or fluctuance. Parotid glands soft without mass  Eyes: no scleral injection  Ears: Right - ear canal clear, TM intact without effusion or erythema. No evidence of any fluid drainage. No mastoid tenderness, erythema, or ear bulging            Left - EAC with white debri noted, consistent with fungal infection. TM not visualized. No evidence of any active fluid drainage. No mastoid tenderness, erythema, or ear bulging.   EAC debri was suctioned at bedside, and TM was visualized - intact without effusion or erythema.  Nose: Nares bilaterally patent, no discharge  Mouth: No Stridor / Drooling / Trismus.  Mucosa moist, tongue/uvula midline, oropharynx clear  Neck: Flat, supple, no lymphadenopathy, trachea midline, no masses  Lymphatic: No lymphadenopathy  Resp: breathing easily, no stridor  Neuro: facial nerve intact, no facial droop

## 2019-02-08 ENCOUNTER — TRANSCRIPTION ENCOUNTER (OUTPATIENT)
Age: 49
End: 2019-02-08

## 2019-02-08 VITALS
SYSTOLIC BLOOD PRESSURE: 118 MMHG | DIASTOLIC BLOOD PRESSURE: 80 MMHG | OXYGEN SATURATION: 97 % | TEMPERATURE: 99 F | RESPIRATION RATE: 18 BRPM | HEART RATE: 85 BPM

## 2019-02-08 LAB
ANION GAP SERPL CALC-SCNC: 10 MMOL/L — SIGNIFICANT CHANGE UP (ref 5–17)
BUN SERPL-MCNC: 5 MG/DL — LOW (ref 7–23)
CALCIUM SERPL-MCNC: 8.7 MG/DL — SIGNIFICANT CHANGE UP (ref 8.4–10.5)
CHLORIDE SERPL-SCNC: 108 MMOL/L — SIGNIFICANT CHANGE UP (ref 96–108)
CO2 SERPL-SCNC: 24 MMOL/L — SIGNIFICANT CHANGE UP (ref 22–31)
CREAT SERPL-MCNC: 0.75 MG/DL — SIGNIFICANT CHANGE UP (ref 0.5–1.3)
GLUCOSE SERPL-MCNC: 90 MG/DL — SIGNIFICANT CHANGE UP (ref 70–99)
HCT VFR BLD CALC: 35.7 % — SIGNIFICANT CHANGE UP (ref 34.5–45)
HGB BLD-MCNC: 11.4 G/DL — LOW (ref 11.5–15.5)
MAGNESIUM SERPL-MCNC: 2 MG/DL — SIGNIFICANT CHANGE UP (ref 1.6–2.6)
MCHC RBC-ENTMCNC: 27.6 PG — SIGNIFICANT CHANGE UP (ref 27–34)
MCHC RBC-ENTMCNC: 31.9 GM/DL — LOW (ref 32–36)
MCV RBC AUTO: 86.4 FL — SIGNIFICANT CHANGE UP (ref 80–100)
PHOSPHATE SERPL-MCNC: 2.6 MG/DL — SIGNIFICANT CHANGE UP (ref 2.5–4.5)
PLATELET # BLD AUTO: 174 K/UL — SIGNIFICANT CHANGE UP (ref 150–400)
POTASSIUM SERPL-MCNC: 3.7 MMOL/L — SIGNIFICANT CHANGE UP (ref 3.5–5.3)
POTASSIUM SERPL-SCNC: 3.7 MMOL/L — SIGNIFICANT CHANGE UP (ref 3.5–5.3)
RBC # BLD: 4.13 M/UL — SIGNIFICANT CHANGE UP (ref 3.8–5.2)
RBC # FLD: 13.7 % — SIGNIFICANT CHANGE UP (ref 10.3–14.5)
SODIUM SERPL-SCNC: 142 MMOL/L — SIGNIFICANT CHANGE UP (ref 135–145)
WBC # BLD: 3.17 K/UL — LOW (ref 3.8–10.5)
WBC # FLD AUTO: 3.17 K/UL — LOW (ref 3.8–10.5)

## 2019-02-08 PROCEDURE — 81001 URINALYSIS AUTO W/SCOPE: CPT

## 2019-02-08 PROCEDURE — 93005 ELECTROCARDIOGRAM TRACING: CPT

## 2019-02-08 PROCEDURE — 85610 PROTHROMBIN TIME: CPT

## 2019-02-08 PROCEDURE — 86900 BLOOD TYPING SEROLOGIC ABO: CPT

## 2019-02-08 PROCEDURE — 86850 RBC ANTIBODY SCREEN: CPT

## 2019-02-08 PROCEDURE — 85730 THROMBOPLASTIN TIME PARTIAL: CPT

## 2019-02-08 PROCEDURE — 80053 COMPREHEN METABOLIC PANEL: CPT

## 2019-02-08 PROCEDURE — 96365 THER/PROPH/DIAG IV INF INIT: CPT | Mod: XU

## 2019-02-08 PROCEDURE — 85027 COMPLETE CBC AUTOMATED: CPT

## 2019-02-08 PROCEDURE — 84702 CHORIONIC GONADOTROPIN TEST: CPT

## 2019-02-08 PROCEDURE — 99232 SBSQ HOSP IP/OBS MODERATE 35: CPT

## 2019-02-08 PROCEDURE — 84100 ASSAY OF PHOSPHORUS: CPT

## 2019-02-08 PROCEDURE — 86901 BLOOD TYPING SEROLOGIC RH(D): CPT

## 2019-02-08 PROCEDURE — 99285 EMERGENCY DEPT VISIT HI MDM: CPT | Mod: 25

## 2019-02-08 PROCEDURE — 96375 TX/PRO/DX INJ NEW DRUG ADDON: CPT

## 2019-02-08 PROCEDURE — 83690 ASSAY OF LIPASE: CPT

## 2019-02-08 PROCEDURE — 74177 CT ABD & PELVIS W/CONTRAST: CPT

## 2019-02-08 PROCEDURE — 80048 BASIC METABOLIC PNL TOTAL CA: CPT

## 2019-02-08 PROCEDURE — 83735 ASSAY OF MAGNESIUM: CPT

## 2019-02-08 PROCEDURE — 96361 HYDRATE IV INFUSION ADD-ON: CPT

## 2019-02-08 PROCEDURE — 71045 X-RAY EXAM CHEST 1 VIEW: CPT

## 2019-02-08 RX ORDER — METRONIDAZOLE 500 MG
500 TABLET ORAL EVERY 8 HOURS
Qty: 0 | Refills: 0 | Status: DISCONTINUED | OUTPATIENT
Start: 2019-02-08 | End: 2019-02-08

## 2019-02-08 RX ORDER — SODIUM,POTASSIUM PHOSPHATES 278-250MG
1 POWDER IN PACKET (EA) ORAL ONCE
Qty: 0 | Refills: 0 | Status: COMPLETED | OUTPATIENT
Start: 2019-02-08 | End: 2019-02-08

## 2019-02-08 RX ORDER — CIPROFLOXACIN LACTATE 400MG/40ML
500 VIAL (ML) INTRAVENOUS EVERY 12 HOURS
Qty: 0 | Refills: 0 | Status: DISCONTINUED | OUTPATIENT
Start: 2019-02-08 | End: 2019-02-08

## 2019-02-08 RX ORDER — CIPROFLOXACIN LACTATE 400MG/40ML
1 VIAL (ML) INTRAVENOUS
Qty: 28 | Refills: 0 | OUTPATIENT
Start: 2019-02-08 | End: 2019-02-21

## 2019-02-08 RX ORDER — METRONIDAZOLE 500 MG
1 TABLET ORAL
Qty: 42 | Refills: 0 | OUTPATIENT
Start: 2019-02-08 | End: 2019-02-21

## 2019-02-08 RX ORDER — METRONIDAZOLE 500 MG
1 TABLET ORAL
Qty: 42 | Refills: 0
Start: 2019-02-08 | End: 2019-02-21

## 2019-02-08 RX ORDER — ACETAMINOPHEN 500 MG
650 TABLET ORAL EVERY 6 HOURS
Qty: 0 | Refills: 0 | Status: DISCONTINUED | OUTPATIENT
Start: 2019-02-08 | End: 2019-02-08

## 2019-02-08 RX ORDER — CIPROFLOXACIN LACTATE 400MG/40ML
1 VIAL (ML) INTRAVENOUS
Qty: 28 | Refills: 0
Start: 2019-02-08 | End: 2019-02-21

## 2019-02-08 RX ADMIN — Medication 650 MILLIGRAM(S): at 06:21

## 2019-02-08 RX ADMIN — Medication 100 MILLIGRAM(S): at 05:53

## 2019-02-08 RX ADMIN — Medication 500 MILLIGRAM(S): at 13:28

## 2019-02-08 RX ADMIN — Medication 650 MILLIGRAM(S): at 05:51

## 2019-02-08 RX ADMIN — Medication 2 APPLICATION(S): at 05:53

## 2019-02-08 RX ADMIN — Medication 200 MILLIGRAM(S): at 05:53

## 2019-02-08 RX ADMIN — Medication 1 PACKET(S): at 10:06

## 2019-02-08 NOTE — DISCHARGE NOTE ADULT - CARE PROVIDER_API CALL
Mati Arreaga)  ColonRectal Surgery; Surgery  [address], [address]  [place] [address]  Phone: [phone]  Fax: [phone]  Follow Up Time:     Giuliana Ibarra)  Otolaryngology  [address], [facility] [address]  [place] [address]  Phone: [phone]  Fax: [phone]  Follow Up Time:

## 2019-02-08 NOTE — DISCHARGE NOTE ADULT - CARE PLAN
Principal Discharge DX:	Bowel obstruction  Goal:	resolution of abdominal pain and return of bowel function  Assessment and plan of treatment:	Low fiber diet. Ambulation encouraged. Follow up with Dr. Arreaga in 2-4 weeks  Secondary Diagnosis:	Crohn disease  Assessment and plan of treatment:	Take antibiotics as prescribed. Follow up with primary Gastroenterologist Dr. Mark Yousif within 1 week from discharge [phone]  Secondary Diagnosis:	Otomycosis of left ear  Assessment and plan of treatment:	Continue Clotrimazole drops to left ear. Follow up with ENT Dr. Giuliana Ibarra within 1 week from discharge

## 2019-02-08 NOTE — DISCHARGE NOTE ADULT - MEDICATION SUMMARY - MEDICATIONS TO TAKE
I will START or STAY ON the medications listed below when I get home from the hospital:  None I will START or STAY ON the medications listed below when I get home from the hospital:    Flagyl 500 mg oral tablet  -- 1 tab(s) by mouth every 8 hours for Crohns flare  -- Indication: For Crohns    Anti-Fungal Liquid 1% topical solution  -- 2 application on skin 2 times a day to left ear for ear infection  -- Indication: For left ear infection    ciprofloxacin 500 mg oral tablet  -- 1 tab(s) by mouth every 12 hours for Crohns flare  -- Indication: For Crohns

## 2019-02-08 NOTE — DISCHARGE NOTE ADULT - ADDITIONAL INSTRUCTIONS
Follow up with Dr. Arreaga in 2-4 weeks. Follow up with Gastroenterology and ENT within 1 week from discharge

## 2019-02-08 NOTE — PROGRESS NOTE ADULT - SUBJECTIVE AND OBJECTIVE BOX
Surgery Progress Note     Subjective/24hour Events:   Patient seen and examined.   No acute events overnight.   Remains afebrile, hemodynamically stable.   Passing flatus, no BM.     Vital Signs:  Vital Signs Last 24 Hrs  T(C): 36.9 (08 Feb 2019 01:18), Max: 37.2 (07 Feb 2019 17:08)  T(F): 98.4 (08 Feb 2019 01:18), Max: 99 (07 Feb 2019 17:08)  HR: 70 (08 Feb 2019 01:18) (62 - 72)  BP: 108/68 (08 Feb 2019 01:18) (108/68 - 125/82)  BP(mean): --  RR: 18 (08 Feb 2019 01:18) (17 - 18)  SpO2: 97% (08 Feb 2019 01:18) (95% - 98%)    CAPILLARY BLOOD GLUCOSE          I&O's Detail    06 Feb 2019 07:01  -  07 Feb 2019 07:00  --------------------------------------------------------  IN:    sodium chloride 0.9%.: 1500 mL    Solution: 100 mL    Solution: 100 mL    Solution: 250 mL  Total IN: 1950 mL    OUT:    Voided: 400 mL  Total OUT: 400 mL    Total NET: 1550 mL      07 Feb 2019 07:01  -  08 Feb 2019 01:30  --------------------------------------------------------  IN:    Oral Fluid: 240 mL    sodium chloride 0.9%.: 1500 mL    Solution: 200 mL    Solution: 100 mL    Solution: 200 mL  Total IN: 2240 mL    OUT:    Voided: 2350 mL  Total OUT: 2350 mL    Total NET: -110 mL          MEDICATIONS  (STANDING):  ciprofloxacin   IVPB 400 milliGRAM(s) IV Intermittent every 12 hours  ciprofloxacin   IVPB      clotrimazole 1% Solution 2 Application(s) Topical two times a day  enoxaparin Injectable 40 milliGRAM(s) SubCutaneous daily  metroNIDAZOLE  IVPB      metroNIDAZOLE  IVPB 500 milliGRAM(s) IV Intermittent every 8 hours  sodium chloride 0.9%. 1000 milliLiter(s) (125 mL/Hr) IV Continuous <Continuous>    MEDICATIONS  (PRN):      Physical Exam:  Gen: NAD.  Ab: Soft, nontender, minimally distended.   Ext: Moves all 4 spontaneously.     Labs:    02-07    140  |  107  |  8   ----------------------------<  95  3.6   |  23  |  0.70    Ca    8.3<L>      07 Feb 2019 07:27  Phos  2.2     02-07  Mg     2.2     02-07    TPro  7.8  /  Alb  4.6  /  TBili  0.3  /  DBili  x   /  AST  16  /  ALT  13  /  AlkPhos  65  02-06    LIVER FUNCTIONS - ( 06 Feb 2019 11:03 )  Alb: 4.6 g/dL / Pro: 7.8 g/dL / ALK PHOS: 65 U/L / ALT: 13 U/L / AST: 16 U/L / GGT: x                                 11.4   4.30  )-----------( 165      ( 07 Feb 2019 08:59 )             35.5     PT/INR - ( 07 Feb 2019 08:59 )   PT: 12.9 sec;   INR: 1.15 ratio         PTT - ( 07 Feb 2019 08:59 )  PTT:29.1 sec    Imaging:

## 2019-02-08 NOTE — PROGRESS NOTE ADULT - ASSESSMENT
Assessment: 48F with history of Crohn's Disease (not on any medication, has taken steroids in the past), laparoscopic appendectomy c/b SBO, which resolved with medical management only, and uterine rupture requiring operative repair (appendectomy, SBO, and uterine rupture all occurred during the same pregnancy)  p/w high-grade SBO with transition point in the distal ileum and proximally distended small bowel with fecalization.     Plan:  - Appreciate GI input: continue abx, trial of clears.   - Appreciate ENT intervention: now s/p ear suctioned, on Clotrimazole. To f/u as outpatient.   - CLD.   - Serial abdominal exams  - Monitor for GI fxn.   - Cont Cipro/Flagyl  - Appreciate GI recs: cont abx, trial of clears, iv steroids if worsening symptoms, outpt f/u  - DVT PPX  - AM Labs    Red Team Surgery Pager #[phone]

## 2019-02-08 NOTE — DISCHARGE NOTE ADULT - PATIENT PORTAL LINK FT
You can access the [facility] Patient Portal, offered by [facility], by registering with the following website: [email or web address]

## 2019-02-08 NOTE — DISCHARGE NOTE ADULT - CARE PROVIDERS DIRECT ADDRESSES
[email or web address]

## 2019-02-08 NOTE — DISCHARGE NOTE ADULT - HOSPITAL COURSE
48 year old female p/w with abdominal pain found to have p/w high-grade SBO with transition point in the distal ileum and proximally distended small bowel with fecalization. Patient admitted to surgery, made NPO/IVF. Gastroenterology consulted with recommendations for Ciprofloxacin and flagyl. With improvement of abdominal pain and return of bowel function, patient was advanced on diet as tolerated. ENT also consulted for left ear pain and found to have left otomycosis and started on eye drops. On the day of discharge, patient was tolerating diet, having bowel function with improvement of abdominal pain. Patient is deemed stable for discharge home with follow up care with surgery, ENT, GI

## 2019-02-08 NOTE — DISCHARGE NOTE ADULT - PLAN OF CARE
resolution of abdominal pain and return of bowel function Low fiber diet. Ambulation encouraged. Follow up with Dr. Arreaga in 2-4 weeks Take antibiotics as prescribed. Follow up with primary Gastroenterologist Dr. Mark Yousif within 1 week from discharge [phone] Continue Clotrimazole drops to left ear. Follow up with ENT Dr. iGuliana Ibarra within 1 week from discharge

## 2019-02-11 PROBLEM — K50.90 CROHN'S DISEASE, UNSPECIFIED, WITHOUT COMPLICATIONS: Chronic | Status: ACTIVE | Noted: 2019-02-06

## 2019-03-01 ENCOUNTER — APPOINTMENT (OUTPATIENT)
Dept: COLORECTAL SURGERY | Facility: CLINIC | Age: 49
End: 2019-03-01
Payer: COMMERCIAL

## 2019-03-01 ENCOUNTER — APPOINTMENT (OUTPATIENT)
Dept: ORTHOPEDIC SURGERY | Facility: CLINIC | Age: 49
End: 2019-03-01
Payer: COMMERCIAL

## 2019-03-01 VITALS
SYSTOLIC BLOOD PRESSURE: 150 MMHG | DIASTOLIC BLOOD PRESSURE: 83 MMHG | HEART RATE: 76 BPM | BODY MASS INDEX: 25.33 KG/M2 | WEIGHT: 152 LBS | HEIGHT: 65 IN

## 2019-03-01 VITALS — SYSTOLIC BLOOD PRESSURE: 128 MMHG | HEART RATE: 68 BPM | DIASTOLIC BLOOD PRESSURE: 80 MMHG

## 2019-03-01 PROCEDURE — 99204 OFFICE O/P NEW MOD 45 MIN: CPT

## 2019-03-01 PROCEDURE — 99242 OFF/OP CONSLTJ NEW/EST SF 20: CPT

## 2019-03-02 NOTE — DATA REVIEWED
[Imaging Present] : Present [de-identified] : MRI of the left hand shows a soft tissue mass on the radial side of the middle phalanx extending from the flexor tendon volarly to the extensor tendon dorsally. It is approximately 3 x 2 cm and multiple lobules. 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

## 2019-03-02 NOTE — PHYSICAL EXAM
[FreeTextEntry1] : On exam the patient stands and her bowels. She is not having any abdominal symptoms right now. In her nondominant LEFT hand she has a mass over the radial side of her middle phalanx. It goes all the way volar as well as dorsal. It is mildly mobile. There is a Tinel's with pain in the area with minimal radiation distally. She does have good sensation and normal blood flow distally. She has good motion and she has no lymphadenopathy at the epitrochlear or axillary area. [General Appearance - Well-Appearing] : Well appearing [General Appearance - Well Nourished] : well nourished [Oriented To Time, Place, And Person] : Oriented to person, place, and time [Impaired Insight] : Insight and judgment were intact [Affect] : The affect was normal. [Mood] : the mood was normal [Sclera] : the sclera and conjunctiva were normal [Neck Cervical Mass (___cm)] : no neck mass was observed [Heart Rate And Rhythm] : heart rate was normal and rhythm regular [] : No respiratory distress [Abdomen Soft] : Soft [Abdomen Tenderness] : non-tender [Normal Station and Gait] : gait and station were normal [Tenderness] : tenderness [Ecchymosis] : no ecchymosis [Swelling] : swelling [Masses] : masses [Erythema] : no erythema [Skin Changes - Describe changes:] : No skin changes noted [Full UE ROM unless otherwise noted:] : Full range of motion unless otherwise noted. [UE Motor Strength Normal unless otherwise noted:] : 5/5 strength in bilateral upper extremities unless otherwise noted. [Normal] : Sensation intact to light touch.

## 2019-03-02 NOTE — HISTORY OF PRESENT ILLNESS
[FreeTextEntry1] : This is a 48-year-old female has a history of Crohn's disease who started having a mass that she noticed the approximately 4 or 5 years ago. The bump has grown bigger over the course of time. It gives her pain especially when it is touched on but other than this is not bothersome. She has gotten to the point over the past few months but she wants to have it examined and taken out. She is insert and taken up until she had a problem with her small bowel and cancel the surgery. Following this she then followed up with me after MRI showed a mass with a questionable possible neoplasm. [___ yrs] : [unfilled] year(s) ago [1] : currently ~his/her~ pain is 1 out of 10

## 2019-03-02 NOTE — REVIEW OF SYSTEMS
[Chills] : no chills [Feeling Tired] : not feeling tired [Fever] : no fever [Nl] : Hematologic/Lymphatic

## 2019-03-02 NOTE — DISCUSSION/SUMMARY
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 There are the things that this could be however it is the most common. This also could be a schwannoma however being multilobular and this is also less likely. Recommendations are for biopsy and resection. She understood the risks benefits and alternatives including but limited to malignancy and recurrence. Furthermore because this is where the neurovascular bundle and into dissectors to the side. She still may have some numbness in the area. We'll plan to do this in the near future as her gastroenterologist won't start her on some biologic this with relationship to her Crohn's disease in the near future.\par \par If imaging was ordered, the patient was told to make an appointment to review findings right after all imaging is completed.\par \par We discussed risks, benefits and alternatives. Rationale of care was reviewed and all questions were answered.

## 2019-03-03 ENCOUNTER — TRANSCRIPTION ENCOUNTER (OUTPATIENT)
Age: 49
End: 2019-03-03

## 2019-03-04 ENCOUNTER — OUTPATIENT (OUTPATIENT)
Dept: OUTPATIENT SERVICES | Facility: HOSPITAL | Age: 49
LOS: 1 days | Discharge: HOME | End: 2019-03-04
Payer: COMMERCIAL

## 2019-03-04 ENCOUNTER — APPOINTMENT (OUTPATIENT)
Dept: ORTHOPEDIC SURGERY | Facility: HOSPITAL | Age: 49
End: 2019-03-04

## 2019-03-04 ENCOUNTER — RESULT REVIEW (OUTPATIENT)
Age: 49
End: 2019-03-04

## 2019-03-04 VITALS
OXYGEN SATURATION: 100 % | WEIGHT: 151.9 LBS | DIASTOLIC BLOOD PRESSURE: 77 MMHG | RESPIRATION RATE: 16 BRPM | HEART RATE: 65 BPM | SYSTOLIC BLOOD PRESSURE: 122 MMHG | HEIGHT: 65 IN | TEMPERATURE: 96 F

## 2019-03-04 VITALS
DIASTOLIC BLOOD PRESSURE: 68 MMHG | SYSTOLIC BLOOD PRESSURE: 110 MMHG | RESPIRATION RATE: 16 BRPM | HEART RATE: 72 BPM | OXYGEN SATURATION: 96 %

## 2019-03-04 DIAGNOSIS — R22.32 LOCALIZED SWELLING, MASS AND LUMP, LEFT UPPER LIMB: ICD-10-CM

## 2019-03-04 DIAGNOSIS — Z90.49 ACQUIRED ABSENCE OF OTHER SPECIFIED PARTS OF DIGESTIVE TRACT: Chronic | ICD-10-CM

## 2019-03-04 DIAGNOSIS — S37.69XA OTHER INJURY OF UTERUS, INITIAL ENCOUNTER: Chronic | ICD-10-CM

## 2019-03-04 DIAGNOSIS — K56.609 UNSPECIFIED INTESTINAL OBSTRUCTION, UNSPECIFIED AS TO PARTIAL VERSUS COMPLETE OBSTRUCTION: Chronic | ICD-10-CM

## 2019-03-04 DIAGNOSIS — Z87.42 PERSONAL HISTORY OF OTHER DISEASES OF THE FEMALE GENITAL TRACT: ICD-10-CM

## 2019-03-04 LAB — HCG UR QL: NEGATIVE — SIGNIFICANT CHANGE UP

## 2019-03-04 PROCEDURE — 26145 TENDON EXCISION PALM/FINGER: CPT | Mod: F2

## 2019-03-04 PROCEDURE — 88331 PATH CONSLTJ SURG 1 BLK 1SPC: CPT | Mod: 26

## 2019-03-04 PROCEDURE — 88305 TISSUE EXAM BY PATHOLOGIST: CPT | Mod: 26

## 2019-03-04 PROCEDURE — 26117 RAD RESECT HAND TUMOR < 3 CM: CPT | Mod: F2

## 2019-03-04 RX ORDER — SODIUM CHLORIDE 9 MG/ML
1000 INJECTION INTRAMUSCULAR; INTRAVENOUS; SUBCUTANEOUS
Qty: 0 | Refills: 0 | Status: DISCONTINUED | OUTPATIENT
Start: 2019-03-04 | End: 2019-03-19

## 2019-03-04 RX ORDER — CLOTRIMAZOLE AND BETAMETHASONE DIPROPIONATE 10; .5 MG/G; MG/G
1-0.05 CREAM TOPICAL
Qty: 1 | Refills: 0 | Status: ACTIVE | COMMUNITY
Start: 2019-03-04 | End: 1900-01-01

## 2019-03-04 RX ORDER — OXYCODONE HYDROCHLORIDE 5 MG/1
1 TABLET ORAL
Qty: 10 | Refills: 0 | OUTPATIENT
Start: 2019-03-04

## 2019-03-04 RX ORDER — OXYCODONE AND ACETAMINOPHEN 5; 325 MG/1; MG/1
1 TABLET ORAL
Qty: 10 | Refills: 0
Start: 2019-03-04

## 2019-03-04 RX ORDER — SODIUM CHLORIDE 9 MG/ML
1000 INJECTION, SOLUTION INTRAVENOUS
Qty: 0 | Refills: 0 | Status: DISCONTINUED | OUTPATIENT
Start: 2019-03-04 | End: 2019-03-19

## 2019-03-04 RX ADMIN — SODIUM CHLORIDE 30 MILLILITER(S): 9 INJECTION INTRAMUSCULAR; INTRAVENOUS; SUBCUTANEOUS at 14:27

## 2019-03-04 NOTE — H&P ADULT - HISTORY OF PRESENT ILLNESS
HPI:   48yFemale w/ hx of Crohn's disease and recent high grade SBO treated non-operatively, who presents with 4-5 years of growing mass on the left hand. She says that it has started to get painful over the past year to year and a half. Mass is on the L middle finger.    Review of systems: As per HPI, otherwise negative.     PAST MEDICAL & SURGICAL HISTORY:  Crohn disease  No pertinent past medical history  Uterine rupture  Bowel obstruction  History of appendectomy    Family History: FAMILY HISTORY:    Medications: MEDICATIONS  (STANDING):    EXAM:  Awake, Alert and in no acute distress  LUE: skin intact  lobular mass on the radial side of the middle finger involving the middle and distal phalanx, involves both the volar and dorsal aspects of the finger  SILT throughout  Fires EPL/FDP/IO  WWP distally, radial pulse palpable    Imaging  MRI reviewed: multilobular mass of L middle finger    A/P: This is a 48y Female who presents today with mass of L finger, for surgical biopsy and possible exision    - OR today with Dr. Pedroza  - Surgical biopsy, possible excision of mass  - NPO/IVF  - Preop labs    Nelson Alcantar MD

## 2019-03-04 NOTE — ASU DISCHARGE PLAN (ADULT/PEDIATRIC). - MEDICATION SUMMARY - MEDICATIONS TO TAKE
I will START or STAY ON the medications listed below when I get home from the hospital:    Flagyl 500 mg oral tablet  -- 1 tab(s) by mouth every 8 hours for Crohns flare  -- Indication: For Home med    acetaminophen-oxyCODONE 325 mg-5 mg oral tablet  -- 1-2 tab(s) by mouth every 4-6 hours, As Needed MDD:6  -- Caution federal law prohibits the transfer of this drug to any person other  than the person for whom it was prescribed.  May cause drowsiness.  Alcohol may intensify this effect.  Use care when operating dangerous machinery.  This prescription cannot be refilled.  This product contains acetaminophen.  Do not use  with any other product containing acetaminophen to prevent possible liver damage.  Using more of this medication than prescribed may cause serious breathing problems.    -- Indication: For Pain control    Anti-Fungal Liquid 1% topical solution  -- 2 application on skin 2 times a day to left ear for ear infection  -- Indication: For Home med    ciprofloxacin 500 mg oral tablet  -- 1 tab(s) by mouth every 12 hours for Crohns flare  -- Indication: For Home med

## 2019-03-04 NOTE — HISTORY OF PRESENT ILLNESS
[FreeTextEntry1] : Pleasant 48-year-old female who presents for a routine follow-up visit. The patient was admitted to the hospital a few weeks ago with a small bowel obstruction thought secondary to Crohn's exacerbation. Patient is known to have terminal ileal Crohn's disease for many years. Her symptoms have been under control and she has not been on any medication of late. After the ingestion of a high fiber meal she developed severe abdominal cramping and abdominal distention. She was evaluated in the emergency room and CAT scan revealed bowel obstruction with ileal thickening consistent with Crohn's flare. The symptoms rapidly resolved and she was discharged home.\par \par She has followed up with her gastroenterologist and the plan is to start Remicade. She will also have a CT enterography. \par \par Currently the patient feels and looks well. Her abdomen is soft and nontender.\par \par Since her symptoms resolved so quickly I do agree with proceeding with medical therapy. Should there be a significant amount of fibro-stenotic disease seen on her small bowel evaluation and symptoms recur despite biologics, in that circumstance we might consider surgical intervention.

## 2019-03-04 NOTE — ASU DISCHARGE PLAN (ADULT/PEDIATRIC). - NOTIFY
Persistent Nausea and Vomiting/Unable to Urinate/Pain not relieved by Medications/Swelling that continues/Bleeding that does not stop/Numbness, color, or temperature change to extremity/Fever greater than 101

## 2019-03-04 NOTE — ASU DISCHARGE PLAN (ADULT/PEDIATRIC). - SPECIAL INSTRUCTIONS
Narcotic pain medication may cause nausea or constipation. Take medication with food. Increase fluids and fiber intake. 1 Percocet contains 325mg. of Tylenol (ACETAMINOPHEN) . DO NOT EXCEED 3000mg  of Tylenol in a 24 hour period.

## 2019-03-04 NOTE — H&P ADULT - ATTENDING COMMENTS
I have reviewed and agree with note as written above  for elective biopsy and possible resection left middle finger lesion  Risks, benefits and alternatives discussed with patient.  Enrico Pedroza MD  Musculoskeletal Oncology  [phone]

## 2019-03-13 PROBLEM — Z87.898 HISTORY OF BLOOD LOSS: Status: RESOLVED | Noted: 2017-06-28 | Resolved: 2019-03-13

## 2019-03-14 ENCOUNTER — APPOINTMENT (OUTPATIENT)
Dept: OBGYN | Facility: CLINIC | Age: 49
End: 2019-03-14
Payer: COMMERCIAL

## 2019-03-14 VITALS
SYSTOLIC BLOOD PRESSURE: 120 MMHG | BODY MASS INDEX: 24.99 KG/M2 | WEIGHT: 150 LBS | HEIGHT: 65 IN | DIASTOLIC BLOOD PRESSURE: 78 MMHG

## 2019-03-14 DIAGNOSIS — Z09 ENCOUNTER FOR FOLLOW-UP EXAMINATION AFTER COMPLETED TREATMENT FOR CONDITIONS OTHER THAN MALIGNANT NEOPLASM: ICD-10-CM

## 2019-03-14 DIAGNOSIS — Z01.419 ENCOUNTER FOR GYNECOLOGICAL EXAMINATION (GENERAL) (ROUTINE) W/OUT ABNORMAL FINDINGS: ICD-10-CM

## 2019-03-14 DIAGNOSIS — Z87.898 PERSONAL HISTORY OF OTHER SPECIFIED CONDITIONS: ICD-10-CM

## 2019-03-14 DIAGNOSIS — N89.8 OTHER SPECIFIED NONINFLAMMATORY DISORDERS OF VAGINA: ICD-10-CM

## 2019-03-14 DIAGNOSIS — Z48.89 ENCOUNTER FOR OTHER SPECIFIED SURGICAL AFTERCARE: ICD-10-CM

## 2019-03-14 PROCEDURE — 99396 PREV VISIT EST AGE 40-64: CPT

## 2019-03-14 NOTE — HISTORY OF PRESENT ILLNESS
[Good] : being in good health [Reproductive Age] : is of reproductive age [Stabbing] : stabbing [Definite:  ___ (Date)] : the last menstrual period was [unfilled] [Normal Amount/Duration] : was of a normal amount and duration [Regular Cycle Intervals] : periods have been regular [Frequency: Q ___ days] : menstrual periods occur approximately every [unfilled] days [Sexually Active] : is sexually active [Monogamous] : is monogamous [Age of First Sexual Hooker ___] : became sexually active at the age of [unfilled] [Contraception] : uses contraception [de-identified] : Dec 2017 [Continuous] : no continuous [Dull] : no dull [Sharp] : no sharp [Throbbing] : no throbbing [Burning] : no burning [Itching] : no itching [Mass] : no mass [Stinging] : no stinging [Lesion] : no lesion [Soreness] : no soreness [Discharge] : no discharge [Localized Pain] : no localized pain [Mass (___cm)] : no palpable mass [Diffused Pain] : no diffused pain [Nipple Discharge] : no nipple discharge [Skin Color Change] : no skin color change [FreeTextEntry9] : Patient denies any breast pain or breast tissues [Hot Flashes] : no hot flashes [Night Sweats] : no night sweats [Vaginal Itching] : no vaginal itching [Dyspareunia] : no dyspareunia [Mood Changes] : no mood changes [FreeTextEntry8] : Patient denies any menopausal symptoms [Spotting Between  Menses] : no spotting between menses [Menstrual Cramps] : no menstrual cramps [Currently In Menopause] : not currently in menopause [Experiencing Menopausal Sxs] : not experiencing menopausal symptoms [de-identified] :  since 1997

## 2019-03-14 NOTE — CHIEF COMPLAINT
[Annual Visit] : annual visit [FreeTextEntry1] : \par Patient was last seen Dec 2017\par Issues discussed\par \par Annual examination\par Pap smear and HPV negative November 2015\par Gonorrhea and chlamydia (-) Dec 2017\par Mammogram category 2 in July 2017\par \par \par

## 2019-03-18 ENCOUNTER — APPOINTMENT (OUTPATIENT)
Dept: ORTHOPEDIC SURGERY | Facility: CLINIC | Age: 49
End: 2019-03-18
Payer: COMMERCIAL

## 2019-03-18 VITALS — HEIGHT: 65 IN | BODY MASS INDEX: 25.16 KG/M2 | WEIGHT: 151 LBS

## 2019-03-18 DIAGNOSIS — D48.1 NEOPLASM OF UNCERTAIN BEHAVIOR OF CONNECTIVE AND OTHER SOFT TISSUE: ICD-10-CM

## 2019-03-18 DIAGNOSIS — Z86.19 PERSONAL HISTORY OF OTHER INFECTIOUS AND PARASITIC DISEASES: ICD-10-CM

## 2019-03-18 DIAGNOSIS — R22.32 LOCALIZED SWELLING, MASS AND LUMP, LEFT UPPER LIMB: ICD-10-CM

## 2019-03-18 LAB
C TRACH RRNA SPEC QL NAA+PROBE: NOT DETECTED
CANDIDA VAG CYTO: DETECTED
G VAGINALIS+PREV SP MTYP VAG QL MICRO: NOT DETECTED
HPV HIGH+LOW RISK DNA PNL CVX: NOT DETECTED
N GONORRHOEA RRNA SPEC QL NAA+PROBE: NOT DETECTED
SOURCE AMPLIFICATION: NORMAL
T VAGINALIS VAG QL WET PREP: NOT DETECTED

## 2019-03-18 PROCEDURE — 99024 POSTOP FOLLOW-UP VISIT: CPT

## 2019-03-21 LAB — CYTOLOGY CVX/VAG DOC THIN PREP: NORMAL

## 2019-03-21 NOTE — HISTORY OF PRESENT ILLNESS
[___ Weeks Post Op] : [unfilled] weeks post op [de-identified] : 3/4/2019 - Status post resection of the LEFT long finger mass [de-identified] : Patient is doing well post operatively. She had a paronychia on her other hand but this has since resolved. Her LEFT long finger has good sensation postop. [de-identified] : On exam, the incision is clean dry and intact. Sutures removed and Steri-Strips applied. She still has decreased range of motion but is able to move it with minimal pain. She has normal sensation and Refill distal. 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 [de-identified] : Patient is doing well postoperative. I have taken out her sutures and she is ready for OT for finger range of motion. [de-identified] : Followup in 4 weeks for clinical check. She understands there is a chance of this coming back in the future.\par \par If imaging was ordered, the patient was told to make an appointment to review findings right after all imaging is completed.\par \par We discussed risks, benefits and alternatives. Rationale of care was reviewed and all questions were answered. Patient (and family) had all questions answered to her degree of the level of satisfaction. Patient (and family) expressed understanding and interest in proceeding with the plan as outlined.\par \par \par \par \par This note was done with a voice recognition transcription software and any typos are related to this rather than medical error.

## 2019-04-08 ENCOUNTER — RX RENEWAL (OUTPATIENT)
Age: 49
End: 2019-04-08

## 2019-04-10 ENCOUNTER — CLINICAL ADVICE (OUTPATIENT)
Age: 49
End: 2019-04-10

## 2019-04-10 ENCOUNTER — CHART COPY (OUTPATIENT)
Age: 49
End: 2019-04-10

## 2019-04-10 RX ORDER — FLUCONAZOLE 150 MG/1
150 TABLET ORAL
Qty: 1 | Refills: 0 | Status: ACTIVE | COMMUNITY
Start: 2019-03-18 | End: 1900-01-01

## 2019-04-15 ENCOUNTER — APPOINTMENT (OUTPATIENT)
Dept: ORTHOPEDIC SURGERY | Facility: CLINIC | Age: 49
End: 2019-04-15
Payer: COMMERCIAL

## 2019-04-15 PROCEDURE — 99024 POSTOP FOLLOW-UP VISIT: CPT

## 2019-04-15 NOTE — HISTORY OF PRESENT ILLNESS
[___ Weeks Post Op] : [unfilled] weeks post op [0] : no pain reported [Doing Well] : is doing well [Excellent Pain Control] : has excellent pain control [No Sign of Infection] : is showing no signs of infection [de-identified] : 3/4/2019 - Status post resection of the LEFT long finger mass [de-identified] : Patient is doing well postoperatively. She only has a small abrasion from lifting something yesterday but other than this is doing well with motion in therapy. [de-identified] : Incision is clean dry and intact with a small less than 1 cm diameter abrasion over the radial proximal phalanx. This is from an injury yesterday. The end of the incision has a small scab on it. She otherwise is intact. There is mild swelling and erythema but everything is resolving. She has excellent motion which is almost full with no pain. 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 [de-identified] : Patient is doing well postoperative.She is doing well with physical therapy [de-identified] : I can see the patient back on a p.r.n. basis. Should there be any swelling we could re-image her at no earlier than 6 months otherwise I can see her on a p.r.n. basis. She does understand the risk of recurrence.\par \par If imaging was ordered, the patient was told to make an appointment to review findings right after all imaging is completed.\par \par We discussed risks, benefits and alternatives. Rationale of care was reviewed and all questions were answered. Patient (and family) had all questions answered to her degree of the level of satisfaction. Patient (and family) expressed understanding and interest in proceeding with the plan as outlined.\par \par \par \par \par This note was done with a voice recognition transcription software and any typos are related to this rather than medical error.

## 2019-09-09 ENCOUNTER — APPOINTMENT (OUTPATIENT)
Dept: RHEUMATOLOGY | Facility: CLINIC | Age: 49
End: 2019-09-09

## 2020-12-21 PROBLEM — Z87.42 HISTORY OF VAGINITIS: Status: RESOLVED | Noted: 2019-03-04 | Resolved: 2020-12-21

## 2021-01-08 NOTE — ED ADULT TRIAGE NOTE - WEIGHT METHOD
REFERRING PHYSICIAN: JEOVANY MASSEY    DATE:  01/07/2021    PREOPERATIVE DIAGNOSIS:  Cervical spondylosis, disk osteophyte complex, C4-C7.    POSTOPERATIVE DIAGNOSIS:  Cervical spondylosis, disk osteophyte complex, C4-C7.    PROCEDURE:  Anterior cervical diskectomy, C4-5, C5-6, and C6-7, interbody fusion using lordotic bone graft at all these levels, fusion with Zevo plate with use of intraoperative microscope, fluoroscope, MEP, SSEP of the upper and lower extremity through the whole case.    SURGEON:  Jeovany Rodriguez MD    ASSISTANT:  NERISSA Patino    NEUROMONITORING:  [employer] [facility].    BLOOD LOSS:  Minimal.    COMPLICATIONS:  None.    INDICATION:  The patient with cervical spondylosis, disk osteophyte complex.  After medical treatment, all the risks and complications of the surgery were explained in detail to the patient and to the family.    DESCRIPTION OF PROCEDURE:  After the informed consent was obtained, the patient was taken to the operating room.  General endotracheal anesthesia was obtained.  CHASE compression devices were placed.  Peace catheter was not inserted.  The patient was placed in supine position.  The surgical area was prepped and draped in usual sterile fashion.  The skin was opened with a 15 blade knife from the midline to the right side, deepened with Bovie electrocautery all the way down to the platysma.  The platysma was opened and the avascular plane between the trachea and esophagus medially and the carotid artery laterally were dissected until we got into the prevertebral fascia.  Once in the fascia, this was opened and the paraspinal muscle dissected laterally.  Shadow-Line retractor was applied.  Fluoroscope confirmation of the level was performed.  Under microscope visualization, the disk between C6-C7 was removed initially with 11 blade knife, Kerrison rongeur, pituitary rongeur, and drilling the endplates all the way down to the ligament.  Once in the ligament, it  was opened until we had a good medial and lateral decompression.  After decompression was obtained, hemostasis was obtained with bipolar coagulation.  A 7 x 14 x 11 lordotic bone graft was placed.  The C5-6 level was also with removal of the anterior osteophytes with the drill, removed all the disks with 11 blade knife, Kerrison rongeur, pituitary rongeur, and drilling the endplates all the way down to the ligament.  Once in the ligament, it was removed until we had a good medial and lateral decompression.  After decompression was obtained, hemostasis was obtained with bipolar coagulation.  After preparing the endplates, we placed a lordotic bone graft to fit this snugly once distraction was removed.  The same procedure was done into the C4-5 level.  The disk was removed using 11 blade knife, Kerrison rongeur, pituitary rongeur, and drilling the endplates all the way down to the ligament.  Once in the ligament, this was removed until we had a good medial and lateral decompression.  After decompression was obtained, we prepared the endplates, we placed a lordotic bone graft to fit this snugly once distraction removed.  After the hemostasis with bipolar coagulation of FloSeal, we placed a Zevo plate with bilateral screws at the level C3, 4, 5, 6, 7.  All the screws had good purchase.  They were secured.  The bone quality was very poor.  We placed the screws with no complications, connected with the plate, irrigated multiple times.  The platysma was closed with 2-0 Vicryl, subcutaneous with 2-0 Vicryl, and Steri-Strips for the skin.  There were no complications.        Dictated By:  Jeovany Rodriguez MD        D:  01/07/2021 14:29:11  T:  01/08/2021 01:28:33  MARIA ISABEL/hayden  Job:  [id number]      cc:  JEOVANY MASSEY   stated

## 2021-05-07 ENCOUNTER — OUTPATIENT (OUTPATIENT)
Dept: OUTPATIENT SERVICES | Facility: HOSPITAL | Age: 51
LOS: 1 days | End: 2021-05-07
Payer: COMMERCIAL

## 2021-05-07 ENCOUNTER — APPOINTMENT (OUTPATIENT)
Dept: MRI IMAGING | Facility: CLINIC | Age: 51
End: 2021-05-07
Payer: COMMERCIAL

## 2021-05-07 DIAGNOSIS — S37.69XA OTHER INJURY OF UTERUS, INITIAL ENCOUNTER: Chronic | ICD-10-CM

## 2021-05-07 DIAGNOSIS — Z00.8 ENCOUNTER FOR OTHER GENERAL EXAMINATION: ICD-10-CM

## 2021-05-07 DIAGNOSIS — K56.609 UNSPECIFIED INTESTINAL OBSTRUCTION, UNSPECIFIED AS TO PARTIAL VERSUS COMPLETE OBSTRUCTION: Chronic | ICD-10-CM

## 2021-05-07 DIAGNOSIS — Z90.49 ACQUIRED ABSENCE OF OTHER SPECIFIED PARTS OF DIGESTIVE TRACT: Chronic | ICD-10-CM

## 2021-05-07 PROCEDURE — A9585: CPT

## 2021-05-07 PROCEDURE — 74183 MRI ABD W/O CNTR FLWD CNTR: CPT

## 2021-05-07 PROCEDURE — 74183 MRI ABD W/O CNTR FLWD CNTR: CPT | Mod: 26

## 2021-05-07 PROCEDURE — 72197 MRI PELVIS W/O & W/DYE: CPT | Mod: 26

## 2021-05-07 PROCEDURE — 72197 MRI PELVIS W/O & W/DYE: CPT

## 2021-07-01 ENCOUNTER — APPOINTMENT (OUTPATIENT)
Dept: RHEUMATOLOGY | Facility: CLINIC | Age: 51
End: 2021-07-01
Payer: COMMERCIAL

## 2021-07-01 ENCOUNTER — LABORATORY RESULT (OUTPATIENT)
Age: 51
End: 2021-07-01

## 2021-07-01 VITALS
RESPIRATION RATE: 16 BRPM | BODY MASS INDEX: 25.99 KG/M2 | SYSTOLIC BLOOD PRESSURE: 144 MMHG | DIASTOLIC BLOOD PRESSURE: 86 MMHG | OXYGEN SATURATION: 98 % | HEIGHT: 65 IN | WEIGHT: 156 LBS | HEART RATE: 80 BPM | TEMPERATURE: 97.7 F

## 2021-07-01 DIAGNOSIS — Z84.0 FAMILY HISTORY OF DISEASES OF THE SKIN AND SUBCUTANEOUS TISSUE: ICD-10-CM

## 2021-07-01 DIAGNOSIS — Z83.79 FAMILY HISTORY OF OTHER DISEASES OF THE DIGESTIVE SYSTEM: ICD-10-CM

## 2021-07-01 DIAGNOSIS — R21 RASH AND OTHER NONSPECIFIC SKIN ERUPTION: ICD-10-CM

## 2021-07-01 PROCEDURE — 99072 ADDL SUPL MATRL&STAF TM PHE: CPT

## 2021-07-01 PROCEDURE — 99204 OFFICE O/P NEW MOD 45 MIN: CPT

## 2021-07-02 PROBLEM — Z84.0 FAMILY HISTORY OF PSORIASIS: Status: ACTIVE | Noted: 2021-07-02

## 2021-07-02 PROBLEM — Z83.79 FAMILY HISTORY OF CROHN'S DISEASE: Status: ACTIVE | Noted: 2021-07-02

## 2021-07-02 PROBLEM — R21 RASH: Status: ACTIVE | Noted: 2021-07-02

## 2021-07-02 LAB
ALBUMIN SERPL ELPH-MCNC: 4.3 G/DL
ALP BLD-CCNC: 52 U/L
ALT SERPL-CCNC: 12 U/L
ANION GAP SERPL CALC-SCNC: 10 MMOL/L
APPEARANCE: CLEAR
AST SERPL-CCNC: 16 U/L
BASOPHILS # BLD AUTO: 0.04 K/UL
BASOPHILS NFR BLD AUTO: 0.7 %
BILIRUB SERPL-MCNC: 0.3 MG/DL
BILIRUBIN URINE: NEGATIVE
BLOOD URINE: NEGATIVE
BUN SERPL-MCNC: 14 MG/DL
C3 SERPL-MCNC: 139 MG/DL
C4 SERPL-MCNC: 30 MG/DL
CALCIUM SERPL-MCNC: 9.7 MG/DL
CHLORIDE SERPL-SCNC: 104 MMOL/L
CO2 SERPL-SCNC: 25 MMOL/L
COLOR: YELLOW
CREAT SERPL-MCNC: 0.79 MG/DL
CREAT SPEC-SCNC: 146 MG/DL
CREAT/PROT UR: 0.1 RATIO
CRP SERPL-MCNC: <3 MG/L
DSDNA AB SER-ACNC: <12 IU/ML
EOSINOPHIL # BLD AUTO: 0.11 K/UL
EOSINOPHIL NFR BLD AUTO: 1.8 %
ERYTHROCYTE [SEDIMENTATION RATE] IN BLOOD BY WESTERGREN METHOD: 13 MM/HR
GLUCOSE QUALITATIVE U: NEGATIVE
GLUCOSE SERPL-MCNC: 71 MG/DL
HCT VFR BLD CALC: 40.8 %
HGB BLD-MCNC: 13.4 G/DL
IMM GRANULOCYTES NFR BLD AUTO: 0.3 %
KETONES URINE: NEGATIVE
LEUKOCYTE ESTERASE URINE: NEGATIVE
LYMPHOCYTES # BLD AUTO: 2.47 K/UL
LYMPHOCYTES NFR BLD AUTO: 41.2 %
MAN DIFF?: NORMAL
MCHC RBC-ENTMCNC: 28.7 PG
MCHC RBC-ENTMCNC: 32.8 GM/DL
MCV RBC AUTO: 87.4 FL
MONOCYTES # BLD AUTO: 0.41 K/UL
MONOCYTES NFR BLD AUTO: 6.8 %
NEUTROPHILS # BLD AUTO: 2.95 K/UL
NEUTROPHILS NFR BLD AUTO: 49.2 %
NITRITE URINE: NEGATIVE
PH URINE: 6
PLATELET # BLD AUTO: 216 K/UL
POTASSIUM SERPL-SCNC: 4.2 MMOL/L
PROT SERPL-MCNC: 7.1 G/DL
PROT UR-MCNC: 8 MG/DL
PROTEIN URINE: NORMAL
RBC # BLD: 4.67 M/UL
RBC # FLD: 13.7 %
SODIUM SERPL-SCNC: 139 MMOL/L
SPECIFIC GRAVITY URINE: 1.03
UROBILINOGEN URINE: NORMAL
WBC # FLD AUTO: 6 K/UL

## 2021-07-08 ENCOUNTER — APPOINTMENT (OUTPATIENT)
Dept: GASTROENTEROLOGY | Facility: CLINIC | Age: 51
End: 2021-07-08
Payer: COMMERCIAL

## 2021-07-08 ENCOUNTER — NON-APPOINTMENT (OUTPATIENT)
Age: 51
End: 2021-07-08

## 2021-07-08 VITALS
RESPIRATION RATE: 16 BRPM | HEART RATE: 84 BPM | BODY MASS INDEX: 25.99 KG/M2 | OXYGEN SATURATION: 98 % | HEIGHT: 65 IN | DIASTOLIC BLOOD PRESSURE: 75 MMHG | WEIGHT: 156 LBS | TEMPERATURE: 97.7 F | SYSTOLIC BLOOD PRESSURE: 125 MMHG

## 2021-07-08 PROCEDURE — 99072 ADDL SUPL MATRL&STAF TM PHE: CPT

## 2021-07-08 PROCEDURE — 99204 OFFICE O/P NEW MOD 45 MIN: CPT

## 2021-07-08 NOTE — ASSESSMENT
[FreeTextEntry1] : 50-year-old female, longstanding Crohn's ileitis, stricturing, episodes of obstruction\par Patient doing well for the past 2 years on Remicade\par Notably, patient has had long periods of asymptomatic disease even without therapy\par Unclear how closely related to her Crohn's disease her musculoskeletal complaints truly are\par \par Plan\par Patient unsure at this time regarding transfer of care from her current physician\par Also looking for a office for eventual transfer of care of her children with Crohn's including a 21-year-old currently studying in [place]\par For now she is agreeable to infliximab trough level prior to her next infusion\par Telephone follow-up after this is completed later this summer\par Anticipate contact from her , pediatrician\par \par Patient referred by Dr. Wicho Knutson

## 2021-07-08 NOTE — HISTORY OF PRESENT ILLNESS
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 [place]\par \par Treated initially with Pentasa\par Did well for 10 years without any medications, during most of her pregnancies (7 total)\par \par During the pregnancy of her seventh child, developed acute appendicitis\par At the time of surgery it was also noted that her ileitis was extremely active\par During the first trimester developed bowel obstruction\par Treated with prednisone\par Subsequent uterine rupture, delivery healthy now 10-year-old daughter\par \par Generally did well again between 2011 and 2019, recurrent SBO\par Has been on Remicade since 300 mg every 8 weeks\par No recent drug levels to review\par Infusion nurse, Seng, phone number [phone]\par \par Colonoscopy July 2020\par Distortion of the terminal ileum, mucosa reported as normal (examination by Dr. Ann)\par No mention of depth of insertion\par Recent MRI showing ileitis\par \par Patient denies abdominal pain or diarrhea\par Weight stable\par Is troubled by chronic musculoskeletal complaints, not clearly improved following her Remicade infusions\par Rheumatology work-up in progress\par \par \par Currently under the care of Dr. Calvin Aguilera\par

## 2021-07-09 ENCOUNTER — NON-APPOINTMENT (OUTPATIENT)
Age: 51
End: 2021-07-09

## 2021-07-14 LAB
ANA SER IF-ACNC: NEGATIVE
HISTONE AB SER QL: 0.4 UNITS
HLA-B27 RELATED AG QL: NEGATIVE

## 2021-08-29 ENCOUNTER — NON-APPOINTMENT (OUTPATIENT)
Age: 51
End: 2021-08-29

## 2021-08-29 LAB
ANTIBODIES TO INFLIXIMAB (ATI) CONCENRTRATION: < 3.1 U/ML
PROMETHEUS ANSER IFX: NORMAL
PROMETHEUS LABORATORY FOOTER: NORMAL
SERUM INFLIXIMAB (IFX) CONCENTRATION: 7.5 UG/ML

## 2021-08-30 ENCOUNTER — APPOINTMENT (OUTPATIENT)
Dept: RHEUMATOLOGY | Facility: CLINIC | Age: 51
End: 2021-08-30
Payer: COMMERCIAL

## 2021-08-30 VITALS
DIASTOLIC BLOOD PRESSURE: 87 MMHG | HEIGHT: 65 IN | HEART RATE: 91 BPM | TEMPERATURE: 96.4 F | SYSTOLIC BLOOD PRESSURE: 135 MMHG | OXYGEN SATURATION: 96 % | BODY MASS INDEX: 26.49 KG/M2 | WEIGHT: 159 LBS

## 2021-08-30 DIAGNOSIS — M25.541 PAIN IN JOINTS OF RIGHT HAND: ICD-10-CM

## 2021-08-30 DIAGNOSIS — M53.3 SACROCOCCYGEAL DISORDERS, NOT ELSEWHERE CLASSIFIED: ICD-10-CM

## 2021-08-30 DIAGNOSIS — G89.29 SACROCOCCYGEAL DISORDERS, NOT ELSEWHERE CLASSIFIED: ICD-10-CM

## 2021-08-30 DIAGNOSIS — M25.542 PAIN IN JOINTS OF RIGHT HAND: ICD-10-CM

## 2021-08-30 PROCEDURE — 99213 OFFICE O/P EST LOW 20 MIN: CPT

## 2021-08-30 RX ORDER — DICLOFENAC SODIUM 1% 10 MG/G
1 GEL TOPICAL
Qty: 1 | Refills: 2 | Status: ACTIVE | COMMUNITY
Start: 2021-08-30 | End: 1900-01-01

## 2021-09-03 ENCOUNTER — NON-APPOINTMENT (OUTPATIENT)
Age: 51
End: 2021-09-03

## 2021-09-07 ENCOUNTER — NON-APPOINTMENT (OUTPATIENT)
Age: 51
End: 2021-09-07

## 2021-09-09 ENCOUNTER — APPOINTMENT (OUTPATIENT)
Dept: RADIOLOGY | Facility: CLINIC | Age: 51
End: 2021-09-09

## 2021-09-22 ENCOUNTER — NON-APPOINTMENT (OUTPATIENT)
Age: 51
End: 2021-09-22

## 2021-09-28 ENCOUNTER — NON-APPOINTMENT (OUTPATIENT)
Age: 51
End: 2021-09-28

## 2021-10-01 ENCOUNTER — RESULT REVIEW (OUTPATIENT)
Age: 51
End: 2021-10-01

## 2021-10-01 ENCOUNTER — APPOINTMENT (OUTPATIENT)
Dept: RADIOLOGY | Facility: CLINIC | Age: 51
End: 2021-10-01
Payer: COMMERCIAL

## 2021-10-01 ENCOUNTER — OUTPATIENT (OUTPATIENT)
Dept: OUTPATIENT SERVICES | Facility: HOSPITAL | Age: 51
LOS: 1 days | End: 2021-10-01
Payer: COMMERCIAL

## 2021-10-01 ENCOUNTER — APPOINTMENT (OUTPATIENT)
Dept: ULTRASOUND IMAGING | Facility: CLINIC | Age: 51
End: 2021-10-01
Payer: COMMERCIAL

## 2021-10-01 DIAGNOSIS — K56.609 UNSPECIFIED INTESTINAL OBSTRUCTION, UNSPECIFIED AS TO PARTIAL VERSUS COMPLETE OBSTRUCTION: Chronic | ICD-10-CM

## 2021-10-01 DIAGNOSIS — S37.69XA OTHER INJURY OF UTERUS, INITIAL ENCOUNTER: Chronic | ICD-10-CM

## 2021-10-01 DIAGNOSIS — Z90.49 ACQUIRED ABSENCE OF OTHER SPECIFIED PARTS OF DIGESTIVE TRACT: Chronic | ICD-10-CM

## 2021-10-01 DIAGNOSIS — M25.541 PAIN IN JOINTS OF RIGHT HAND: ICD-10-CM

## 2021-10-01 PROCEDURE — 76881 US COMPL JOINT R-T W/IMG: CPT

## 2021-10-01 PROCEDURE — 76881 US COMPL JOINT R-T W/IMG: CPT | Mod: 26,LT,76

## 2021-10-01 PROCEDURE — 73522 X-RAY EXAM HIPS BI 3-4 VIEWS: CPT

## 2021-10-01 PROCEDURE — 73522 X-RAY EXAM HIPS BI 3-4 VIEWS: CPT | Mod: 26

## 2021-10-07 ENCOUNTER — NON-APPOINTMENT (OUTPATIENT)
Age: 51
End: 2021-10-07

## 2021-10-18 ENCOUNTER — OUTPATIENT (OUTPATIENT)
Dept: OUTPATIENT SERVICES | Facility: HOSPITAL | Age: 51
LOS: 1 days | End: 2021-10-18
Payer: COMMERCIAL

## 2021-10-18 ENCOUNTER — APPOINTMENT (OUTPATIENT)
Dept: MRI IMAGING | Facility: CLINIC | Age: 51
End: 2021-10-18
Payer: COMMERCIAL

## 2021-10-18 DIAGNOSIS — K56.609 UNSPECIFIED INTESTINAL OBSTRUCTION, UNSPECIFIED AS TO PARTIAL VERSUS COMPLETE OBSTRUCTION: Chronic | ICD-10-CM

## 2021-10-18 DIAGNOSIS — Z90.49 ACQUIRED ABSENCE OF OTHER SPECIFIED PARTS OF DIGESTIVE TRACT: Chronic | ICD-10-CM

## 2021-10-18 DIAGNOSIS — M25.541 PAIN IN JOINTS OF RIGHT HAND: ICD-10-CM

## 2021-10-18 DIAGNOSIS — S37.69XA OTHER INJURY OF UTERUS, INITIAL ENCOUNTER: Chronic | ICD-10-CM

## 2021-10-18 PROCEDURE — 72195 MRI PELVIS W/O DYE: CPT

## 2021-10-18 PROCEDURE — 72195 MRI PELVIS W/O DYE: CPT | Mod: 26

## 2021-11-04 ENCOUNTER — APPOINTMENT (OUTPATIENT)
Dept: RHEUMATOLOGY | Facility: CLINIC | Age: 51
End: 2021-11-04

## 2021-12-07 ENCOUNTER — NON-APPOINTMENT (OUTPATIENT)
Age: 51
End: 2021-12-07

## 2021-12-08 ENCOUNTER — NON-APPOINTMENT (OUTPATIENT)
Age: 51
End: 2021-12-08

## 2021-12-15 ENCOUNTER — NON-APPOINTMENT (OUTPATIENT)
Age: 51
End: 2021-12-15

## 2021-12-16 ENCOUNTER — APPOINTMENT (OUTPATIENT)
Dept: RHEUMATOLOGY | Facility: CLINIC | Age: 51
End: 2021-12-16
Payer: COMMERCIAL

## 2021-12-16 VITALS
WEIGHT: 157 LBS | DIASTOLIC BLOOD PRESSURE: 82 MMHG | TEMPERATURE: 97 F | HEART RATE: 87 BPM | OXYGEN SATURATION: 98 % | SYSTOLIC BLOOD PRESSURE: 125 MMHG | RESPIRATION RATE: 16 BRPM | BODY MASS INDEX: 26.16 KG/M2 | HEIGHT: 65 IN

## 2021-12-16 DIAGNOSIS — M79.644 PAIN IN RIGHT FINGER(S): ICD-10-CM

## 2021-12-16 DIAGNOSIS — M79.645 PAIN IN RIGHT FINGER(S): ICD-10-CM

## 2021-12-16 PROCEDURE — 99213 OFFICE O/P EST LOW 20 MIN: CPT

## 2022-01-14 NOTE — ED PROVIDER NOTE - GASTROINTESTINAL [+], MLM
Mildly to Moderately Impaired: difficulty hearing in some environments or speaker may need to increase volume or speak distinctly
ABDOMINAL PAIN/NAUSEA

## 2022-04-27 ENCOUNTER — NON-APPOINTMENT (OUTPATIENT)
Age: 52
End: 2022-04-27

## 2022-04-28 ENCOUNTER — NON-APPOINTMENT (OUTPATIENT)
Age: 52
End: 2022-04-28

## 2022-04-28 ENCOUNTER — APPOINTMENT (OUTPATIENT)
Dept: GASTROENTEROLOGY | Facility: CLINIC | Age: 52
End: 2022-04-28
Payer: COMMERCIAL

## 2022-04-28 VITALS
WEIGHT: 154.25 LBS | TEMPERATURE: 98.6 F | BODY MASS INDEX: 25.7 KG/M2 | HEART RATE: 77 BPM | HEIGHT: 65 IN | OXYGEN SATURATION: 98 % | DIASTOLIC BLOOD PRESSURE: 77 MMHG | SYSTOLIC BLOOD PRESSURE: 127 MMHG

## 2022-04-28 DIAGNOSIS — Z87.19 PERSONAL HISTORY OF OTHER DISEASES OF THE DIGESTIVE SYSTEM: ICD-10-CM

## 2022-04-28 DIAGNOSIS — R19.7 DIARRHEA, UNSPECIFIED: ICD-10-CM

## 2022-04-28 DIAGNOSIS — Z01.818 ENCOUNTER FOR OTHER PREPROCEDURAL EXAMINATION: ICD-10-CM

## 2022-04-28 DIAGNOSIS — Z12.11 ENCOUNTER FOR SCREENING FOR MALIGNANT NEOPLASM OF COLON: ICD-10-CM

## 2022-04-28 DIAGNOSIS — R11.0 NAUSEA: ICD-10-CM

## 2022-04-28 PROCEDURE — 99213 OFFICE O/P EST LOW 20 MIN: CPT

## 2022-04-28 NOTE — PHYSICAL EXAM
[General Appearance - Alert] : alert [General Appearance - In No Acute Distress] : in no acute distress [General Appearance - Well Nourished] : well nourished [General Appearance - Well Developed] : well developed [Sclera] : the sclera and conjunctiva were normal [Outer Ear] : the ears and nose were normal in appearance [Neck Appearance] : the appearance of the neck was normal [Respiration, Rhythm And Depth] : normal respiratory rhythm and effort [Exaggerated Use Of Accessory Muscles For Inspiration] : no accessory muscle use [Apical Impulse] : the apical impulse was normal [Heart Rate And Rhythm] : heart rate was normal and rhythm regular [Heart Sounds] : normal S1 and S2 [Edema] : there was no peripheral edema [Bowel Sounds] : normal bowel sounds [Abdomen Soft] : soft [Suprapubic] : in the suprapubic area [RLQ] : in the right lower quadrant [LLQ] : in the left lower quadrant [Abnormal Walk] : normal gait [Skin Color & Pigmentation] : normal skin color and pigmentation [Skin Turgor] : normal skin turgor [] : no rash [Oriented To Time, Place, And Person] : oriented to person, place, and time [Impaired Insight] : insight and judgment were intact [Affect] : the affect was normal

## 2022-04-29 ENCOUNTER — NON-APPOINTMENT (OUTPATIENT)
Age: 52
End: 2022-04-29

## 2022-04-29 LAB
ALBUMIN SERPL ELPH-MCNC: 4.3 G/DL
ALP BLD-CCNC: 50 U/L
ALT SERPL-CCNC: 17 U/L
ANION GAP SERPL CALC-SCNC: 10 MMOL/L
AST SERPL-CCNC: 19 U/L
BASOPHILS # BLD AUTO: 0.02 K/UL
BASOPHILS NFR BLD AUTO: 0.4 %
BILIRUB SERPL-MCNC: 0.3 MG/DL
BUN SERPL-MCNC: 15 MG/DL
CALCIUM SERPL-MCNC: 9.3 MG/DL
CHLORIDE SERPL-SCNC: 103 MMOL/L
CO2 SERPL-SCNC: 25 MMOL/L
CREAT SERPL-MCNC: 0.89 MG/DL
CRP SERPL-MCNC: 4 MG/L
EGFR: 79 ML/MIN/1.73M2
EOSINOPHIL # BLD AUTO: 0.08 K/UL
EOSINOPHIL NFR BLD AUTO: 1.7 %
GLUCOSE SERPL-MCNC: 91 MG/DL
HBV SURFACE AG SER QL: NONREACTIVE
HCT VFR BLD CALC: 42.7 %
HGB BLD-MCNC: 13.7 G/DL
IMM GRANULOCYTES NFR BLD AUTO: 0.2 %
LYMPHOCYTES # BLD AUTO: 1.84 K/UL
LYMPHOCYTES NFR BLD AUTO: 39.6 %
MAN DIFF?: NORMAL
MCHC RBC-ENTMCNC: 28.9 PG
MCHC RBC-ENTMCNC: 32.1 GM/DL
MCV RBC AUTO: 90.1 FL
MONOCYTES # BLD AUTO: 0.65 K/UL
MONOCYTES NFR BLD AUTO: 14 %
NEUTROPHILS # BLD AUTO: 2.05 K/UL
NEUTROPHILS NFR BLD AUTO: 44.1 %
PLATELET # BLD AUTO: 199 K/UL
POTASSIUM SERPL-SCNC: 3.6 MMOL/L
PROT SERPL-MCNC: 6.9 G/DL
RBC # BLD: 4.74 M/UL
RBC # FLD: 13.2 %
SODIUM SERPL-SCNC: 138 MMOL/L
VIT B12 SERPL-MCNC: 518 PG/ML
WBC # FLD AUTO: 4.65 K/UL

## 2022-05-01 LAB
BACTERIA STL CULT: NORMAL
M TB IFN-G BLD-IMP: NEGATIVE
QUANTIFERON TB PLUS MITOGEN MINUS NIL: 9.95 IU/ML
QUANTIFERON TB PLUS NIL: 0.05 IU/ML
QUANTIFERON TB PLUS TB1 MINUS NIL: 0.06 IU/ML
QUANTIFERON TB PLUS TB2 MINUS NIL: 0.06 IU/ML

## 2022-05-02 LAB
C DIFF TOX GENS STL QL NAA+PROBE: NORMAL
CDIFF BY PCR: NOT DETECTED
GI PCR PANEL, STOOL: ABNORMAL

## 2022-05-02 NOTE — ASSESSMENT
[FreeTextEntry1] : 51 year old female with history of Crohn's disease symptoms stable on Inflectra every 8 week, small bowel obstruction in the past. She is checking if she can get Rapid infusion instead of infusion over 2 hrs. \par Recently had UTI treated with Macrobid antibiotics past 2 days acute gastritis symptoms stable with Pepcid daily. \par Reviewed the labs results, advised to do all other blood and stool testing as ordered yesterday. \par She had CT ordered yesterday. \par Dr Ward also answered her questions advised she can hold off the CT scan for now as her symptoms improved.\par Plan,\par Continue taking Inflectra every 8 week. I will sent order for Rapid infusion She is getting the infusion with [facility] home infusion service. \par Labs as ordered, telephone F/U to discuss the lab result.\par Continue Pepcid 20 mg twice daily, She has the medication home\par Colonoscopy in summer.  I explained to her the risks, alternatives and benefits of colonoscopy. Risk including but not limited to bleeding, perforation, infection adverse medication reaction. Questions were answered. She  stated understanding to the above and is agreeable to proceed with the planned procedures. \par \par \par

## 2022-05-02 NOTE — REASON FOR VISIT
[Follow-Up: _____] : a [unfilled] follow-up visit [FreeTextEntry1] : Crohn's disease, Abdominal discomfort, nausea, diarrhea,

## 2022-05-02 NOTE — HISTORY OF PRESENT ILLNESS
[de-identified] : 51-year-old female, with 25 -year history of ileal Crohn's disease, and small bowel obstruction twice in the past. \par She is on Inflectra infusion every 8 week, next infusion in 4 week .\par Her 3 children with Crohn's disease . Brother and father with Crohn's disease\par She had UTI started took Microbid antibiotics last week Past couple of days she is having abdominal discomfort. She was not eating or drinking as she was afraid to have diarrhea or vomiting.  Yesterday she vomited after eating Ices, she was having severe epigastric discomfort. \par After she spoke with me started taking Pepcid 20 mg twice daily, epigastric discomfort improved, she advanced her diet and over all her symptoms improved. Denies fever, chills, diarrhea, hematochezia or vomiting. \par Her  who is pediatrician ordered stool test to check C Diff and basic blood work, I received those results faxed. I reviewed all the labs resulted normal\par More stool testing is pending. \par Last colonoscopy was in July 2020 By Dr Hawk

## 2022-05-09 LAB — CALPROTECTIN FECAL: 98 UG/G

## 2022-05-12 ENCOUNTER — APPOINTMENT (OUTPATIENT)
Dept: RHEUMATOLOGY | Facility: CLINIC | Age: 52
End: 2022-05-12

## 2022-06-16 ENCOUNTER — APPOINTMENT (OUTPATIENT)
Dept: GASTROENTEROLOGY | Facility: CLINIC | Age: 52
End: 2022-06-16

## 2022-06-27 ENCOUNTER — NON-APPOINTMENT (OUTPATIENT)
Age: 52
End: 2022-06-27

## 2022-07-01 ENCOUNTER — APPOINTMENT (OUTPATIENT)
Dept: OTOLARYNGOLOGY | Facility: CLINIC | Age: 52
End: 2022-07-01

## 2022-07-11 ENCOUNTER — APPOINTMENT (OUTPATIENT)
Dept: RHEUMATOLOGY | Facility: CLINIC | Age: 52
End: 2022-07-11

## 2022-07-11 VITALS
DIASTOLIC BLOOD PRESSURE: 85 MMHG | HEART RATE: 73 BPM | HEIGHT: 65 IN | BODY MASS INDEX: 26.33 KG/M2 | WEIGHT: 158 LBS | OXYGEN SATURATION: 97 % | SYSTOLIC BLOOD PRESSURE: 136 MMHG | TEMPERATURE: 97.8 F

## 2022-07-11 PROCEDURE — 99212 OFFICE O/P EST SF 10 MIN: CPT

## 2022-08-03 ENCOUNTER — NON-APPOINTMENT (OUTPATIENT)
Age: 52
End: 2022-08-03

## 2022-08-03 RX ORDER — SODIUM SULFATE, POTASSIUM SULFATE, MAGNESIUM SULFATE 17.5; 3.13; 1.6 G/ML; G/ML; G/ML
17.5-3.13-1.6 SOLUTION, CONCENTRATE ORAL
Qty: 1 | Refills: 0 | Status: ACTIVE | COMMUNITY
Start: 2022-08-03 | End: 1900-01-01

## 2022-08-11 ENCOUNTER — LABORATORY RESULT (OUTPATIENT)
Age: 52
End: 2022-08-11

## 2022-08-12 NOTE — PRE PROCEDURE NOTE - PRE PROCEDURE EVALUATION
Attending Physician:        Ignacio Ward MD                    Procedure:    Indication for Procedure: crohn's disease  ________________________________________________________  PAST MEDICAL & SURGICAL HISTORY:  Crohn disease      History of appendectomy      Bowel obstruction      Uterine rupture        ALLERGIES:  No Known Allergies    HOME MEDICATIONS:    AICD/PPM: [ ] yes   [ ] no    PERTINENT LAB DATA:                      PHYSICAL EXAMINATION:    T(C): --  HR: --  BP: --  RR: --  SpO2: --    Constitutional: NAD  HEENT: PERRLA, EOMI,    Neck:  No JVD  Respiratory: CTAB/L  Cardiovascular: S1 and S2  Gastrointestinal: BS+, soft, NT/ND  Extremities: No peripheral edema  Neurological: A/O x 3, no focal deficits  Psychiatric: Normal mood, normal affect  Skin: No rashes    ASA Class: I [ ]  II [x ]  III [ ]  IV [ ]    COMMENTS:    The patient is a suitable candidate for the planned procedure unless box checked [ ]  No, explain:

## 2022-08-15 ENCOUNTER — TRANSCRIPTION ENCOUNTER (OUTPATIENT)
Age: 52
End: 2022-08-15

## 2022-08-15 ENCOUNTER — RESULT REVIEW (OUTPATIENT)
Age: 52
End: 2022-08-15

## 2022-08-15 ENCOUNTER — APPOINTMENT (OUTPATIENT)
Dept: GASTROENTEROLOGY | Facility: HOSPITAL | Age: 52
End: 2022-08-15

## 2022-08-15 ENCOUNTER — OUTPATIENT (OUTPATIENT)
Dept: OUTPATIENT SERVICES | Facility: HOSPITAL | Age: 52
LOS: 1 days | End: 2022-08-15
Payer: COMMERCIAL

## 2022-08-15 VITALS
SYSTOLIC BLOOD PRESSURE: 138 MMHG | TEMPERATURE: 98 F | WEIGHT: 156.09 LBS | RESPIRATION RATE: 12 BRPM | HEART RATE: 76 BPM | DIASTOLIC BLOOD PRESSURE: 79 MMHG | OXYGEN SATURATION: 98 % | HEIGHT: 65 IN

## 2022-08-15 VITALS
OXYGEN SATURATION: 100 % | RESPIRATION RATE: 20 BRPM | DIASTOLIC BLOOD PRESSURE: 74 MMHG | SYSTOLIC BLOOD PRESSURE: 119 MMHG | HEART RATE: 70 BPM

## 2022-08-15 DIAGNOSIS — K50.00 CROHN'S DISEASE OF SMALL INTESTINE WITHOUT COMPLICATIONS: ICD-10-CM

## 2022-08-15 DIAGNOSIS — S37.69XA OTHER INJURY OF UTERUS, INITIAL ENCOUNTER: Chronic | ICD-10-CM

## 2022-08-15 DIAGNOSIS — Z90.49 ACQUIRED ABSENCE OF OTHER SPECIFIED PARTS OF DIGESTIVE TRACT: Chronic | ICD-10-CM

## 2022-08-15 DIAGNOSIS — K56.609 UNSPECIFIED INTESTINAL OBSTRUCTION, UNSPECIFIED AS TO PARTIAL VERSUS COMPLETE OBSTRUCTION: Chronic | ICD-10-CM

## 2022-08-15 PROCEDURE — 88305 TISSUE EXAM BY PATHOLOGIST: CPT | Mod: 26

## 2022-08-15 PROCEDURE — 45380 COLONOSCOPY AND BIOPSY: CPT | Mod: XS,PT

## 2022-08-15 PROCEDURE — 88305 TISSUE EXAM BY PATHOLOGIST: CPT

## 2022-08-15 PROCEDURE — 45385 COLONOSCOPY W/LESION REMOVAL: CPT

## 2022-08-15 PROCEDURE — 45385 COLONOSCOPY W/LESION REMOVAL: CPT | Mod: PT

## 2022-08-15 DEVICE — NET RETRV ROT ROTH 2.5MMX230CM: Type: IMPLANTABLE DEVICE | Status: FUNCTIONAL

## 2022-08-15 RX ORDER — SODIUM CHLORIDE 9 MG/ML
500 INJECTION INTRAMUSCULAR; INTRAVENOUS; SUBCUTANEOUS
Refills: 0 | Status: DISCONTINUED | OUTPATIENT
Start: 2022-08-15 | End: 2022-08-30

## 2022-08-15 NOTE — ASU PATIENT PROFILE, ADULT - FALL HARM RISK - UNIVERSAL INTERVENTIONS
Bed in lowest position, wheels locked, appropriate side rails in place/Call bell, personal items and telephone in reach/Instruct patient to call for assistance before getting out of bed or chair/Non-slip footwear when patient is out of bed/[place] to call system/Physically safe environment - no spills, clutter or unnecessary equipment/Purposeful Proactive Rounding/Room/bathroom lighting operational, light cord in reach

## 2022-08-15 NOTE — PRE-ANESTHESIA EVALUATION ADULT - NSANTHOSAYNRD_GEN_A_CORE
No. NITA screening performed.  STOP BANG Legend: 0-2 = LOW Risk; 3-4 = INTERMEDIATE Risk; 5-8 = HIGH Risk

## 2022-08-16 ENCOUNTER — NON-APPOINTMENT (OUTPATIENT)
Age: 52
End: 2022-08-16

## 2022-08-16 LAB — SURGICAL PATHOLOGY STUDY: SIGNIFICANT CHANGE UP

## 2022-08-31 ENCOUNTER — NON-APPOINTMENT (OUTPATIENT)
Age: 52
End: 2022-08-31

## 2022-09-12 ENCOUNTER — NON-APPOINTMENT (OUTPATIENT)
Age: 52
End: 2022-09-12

## 2022-11-10 ENCOUNTER — NON-APPOINTMENT (OUTPATIENT)
Age: 52
End: 2022-11-10

## 2022-11-29 ENCOUNTER — APPOINTMENT (OUTPATIENT)
Dept: ORTHOPEDIC SURGERY | Facility: CLINIC | Age: 52
End: 2022-11-29

## 2022-11-29 VITALS — WEIGHT: 158 LBS | HEIGHT: 65 IN | BODY MASS INDEX: 26.33 KG/M2

## 2022-11-29 DIAGNOSIS — S80.01XA CONTUSION OF RIGHT KNEE, INITIAL ENCOUNTER: ICD-10-CM

## 2022-11-29 DIAGNOSIS — M70.50 OTHER BURSITIS OF KNEE, UNSPECIFIED KNEE: ICD-10-CM

## 2022-11-29 PROCEDURE — 73564 X-RAY EXAM KNEE 4 OR MORE: CPT | Mod: RT

## 2022-11-29 PROCEDURE — 99203 OFFICE O/P NEW LOW 30 MIN: CPT

## 2022-11-29 NOTE — PHYSICAL EXAM
[NL (0)] : extension 0 degrees [5___] : hamstring 5[unfilled]/5 [Equivocal] : equivocal Sushma [] : light touch is intact throughout [Right] : right knee [All Views] : anteroposterior, lateral, skyline, and anteroposterior standing [There are no fractures, subluxations or dislocations. No significant abnormalities are seen] : There are no fractures, subluxations or dislocations. No significant abnormalities are seen [TWNoteComboBox7] : flexion 125 degrees

## 2022-11-29 NOTE — DISCUSSION/SUMMARY
[de-identified] : try ice\par try lido patch\par try elastic sleeve\par \par RE:  SIDRA ARREDONDO \par \par Acct #- [id number] \par \par Attention:  Nurse Reviewer /Medical Director\par \par  \par Based on my patient's condition, I strongly believe that the MRI R knee is medically.necessary.  \par The patient has failed oral meds, injections and PT and conservative treatment in combination or by themselves and therefore needs the MRI.  \par The MRI will dictate further treatment t recommendations.\par \par

## 2022-11-29 NOTE — HISTORY OF PRESENT ILLNESS
[Sudden] : sudden [6] : 6 [2] : 2 [Dull/Aching] : dull/aching [Localized] : localized [Tightness] : tightness [Intermittent] : intermittent [Rest] : rest [de-identified] : 11/29/22: 53 yo F here for eval of R knee pain for the past 6 weeks. Missed a step when going down stairs at house and landed on cement and landed awkwardly. Felt immediate pain in R knee. Pain is around the patella. Bothers her mostly when getting up from sitting. No swelling. No mechanical sx. conservative tx so far - mild relief. \par \par \par \par No nsaids - chrohns , had post-partum "partial DVT" [] : Post Surgical Visit: no [FreeTextEntry1] : right knee [FreeTextEntry3] : 10/10/22 [de-identified] : getting up / pressure [de-identified] : NA

## 2022-12-05 ENCOUNTER — APPOINTMENT (OUTPATIENT)
Dept: ORTHOPEDIC SURGERY | Facility: CLINIC | Age: 52
End: 2022-12-05

## 2022-12-19 ENCOUNTER — APPOINTMENT (OUTPATIENT)
Dept: MRI IMAGING | Facility: CLINIC | Age: 52
End: 2022-12-19

## 2022-12-22 ENCOUNTER — RESULT REVIEW (OUTPATIENT)
Age: 52
End: 2022-12-22

## 2023-01-03 ENCOUNTER — APPOINTMENT (OUTPATIENT)
Dept: ORTHOPEDIC SURGERY | Facility: CLINIC | Age: 53
End: 2023-01-03
Payer: COMMERCIAL

## 2023-01-03 VITALS — WEIGHT: 158 LBS | HEIGHT: 65 IN | BODY MASS INDEX: 26.33 KG/M2

## 2023-01-03 PROCEDURE — 99214 OFFICE O/P EST MOD 30 MIN: CPT

## 2023-01-03 PROCEDURE — 99213 OFFICE O/P EST LOW 20 MIN: CPT

## 2023-01-03 NOTE — DISCUSSION/SUMMARY
[de-identified] : try ice\par try lido patch\par try elastic sleeve\par \par RE:  SIDRA ARREDONDO \par \par Acct #- [id number] \par \par Attention:  Nurse Reviewer /Medical Director\par \par  \par Based on my patient's condition, I strongly believe that the MRI R knee is medically.necessary.  \par The patient has failed oral meds, injections and PT and conservative treatment in combination or by themselves and therefore needs the MRI.  \par The MRI will dictate further treatment t recommendations.\par 01/03/2023 \par \par  RE:  SIDRA ARREDONDO \par \par Acct #- [id number] \par \par \par Attention:  Nurse Reviewer /Medical Director\par \par I am writing this letter as a medical necessity for PT program.\par Patient has tried analgesics, non-steroid anti-inflammatory agents, \par hot or cold compresses,injections of corticosteroids, etc)  which in combination or by themselves has not worked.\par Based on my patient's condition, I strongly believe that the PT is medically needed.\par  \par Thank you for your time and consideration.   \par poss csi she declined\par \par

## 2023-01-03 NOTE — DATA REVIEWED
[MRI] : MRI [Right] : of the right [Knee] : knee [Report was reviewed and noted in the chart] : The report was reviewed and noted in the chart [I reviewed the films/CD and agree] : I reviewed the films/CD and agree [FreeTextEntry1] : no tears, ganglion cyst lat gastroc

## 2023-01-03 NOTE — PHYSICAL EXAM
[Right] : right knee [NL (0)] : extension 0 degrees [5___] : hamstring 5[unfilled]/5 [Equivocal] : equivocal Sushma [] : light touch is intact throughout [TWNoteComboBox7] : flexion 125 degrees

## 2023-01-03 NOTE — HISTORY OF PRESENT ILLNESS
[de-identified] : feels similar, had MRI, on no meds, pain is anterior [Sudden] : sudden [6] : 6 [2] : 2 [Dull/Aching] : dull/aching [Localized] : localized [Tightness] : tightness [Intermittent] : intermittent [Rest] : rest [] : Post Surgical Visit: no [FreeTextEntry1] : right knee [FreeTextEntry3] : 10/10/22 [de-identified] : getting up / pressure [de-identified] : NA

## 2023-01-17 NOTE — ED ADULT NURSE NOTE - IN THE PAST YEAR, HOW OFTEN HAVE YOU USED TOBACCO PRODUCTS?
Never Surgeon/Pathologist Verbiage (Will Incorporate Name Of Surgeon From Intro If Not Blank): operated in two distinct and integrated capacities as the surgeon and pathologist.

## 2023-02-06 ENCOUNTER — APPOINTMENT (OUTPATIENT)
Dept: ORTHOPEDIC SURGERY | Facility: CLINIC | Age: 53
End: 2023-02-06
Payer: COMMERCIAL

## 2023-02-06 VITALS — WEIGHT: 158 LBS | BODY MASS INDEX: 26.33 KG/M2 | HEIGHT: 65 IN

## 2023-02-06 DIAGNOSIS — M18.12 UNILATERAL PRIMARY OSTEOARTHRITIS OF FIRST CARPOMETACARPAL JOINT, LEFT HAND: ICD-10-CM

## 2023-02-06 PROCEDURE — 73130 X-RAY EXAM OF HAND: CPT | Mod: RT

## 2023-02-06 PROCEDURE — 99213 OFFICE O/P EST LOW 20 MIN: CPT

## 2023-02-06 NOTE — HISTORY OF PRESENT ILLNESS
[de-identified] : 2/6/23:  Pt hit her finger 2 wks ago and has had a "weird sensation" in her right ring finger since.  Pt has b/l CTR about 15 years ago.  Pt also has pain at the base of her b/l thumbs.\dominga ANAND,  at [facility] [FreeTextEntry1] : right finger

## 2023-02-06 NOTE — IMAGING
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

## 2023-02-13 ENCOUNTER — APPOINTMENT (OUTPATIENT)
Dept: ORTHOPEDIC SURGERY | Facility: CLINIC | Age: 53
End: 2023-02-13

## 2023-02-15 ENCOUNTER — APPOINTMENT (OUTPATIENT)
Dept: ORTHOPEDIC SURGERY | Facility: CLINIC | Age: 53
End: 2023-02-15
Payer: COMMERCIAL

## 2023-02-15 VITALS — WEIGHT: 158 LBS | HEIGHT: 65 IN | BODY MASS INDEX: 26.33 KG/M2

## 2023-02-15 DIAGNOSIS — Z00.00 ENCOUNTER FOR GENERAL ADULT MEDICAL EXAMINATION W/OUT ABNORMAL FINDINGS: ICD-10-CM

## 2023-02-15 DIAGNOSIS — M70.61 TROCHANTERIC BURSITIS, RIGHT HIP: ICD-10-CM

## 2023-02-15 DIAGNOSIS — M70.51 OTHER BURSITIS OF KNEE, RIGHT KNEE: ICD-10-CM

## 2023-02-15 PROCEDURE — 99213 OFFICE O/P EST LOW 20 MIN: CPT

## 2023-02-15 PROCEDURE — 99214 OFFICE O/P EST MOD 30 MIN: CPT

## 2023-02-15 PROCEDURE — 73502 X-RAY EXAM HIP UNI 2-3 VIEWS: CPT

## 2023-02-15 RX ORDER — LIDOCAINE 5% 700 MG/1
5 PATCH TOPICAL
Qty: 30 | Refills: 0 | Status: ACTIVE | COMMUNITY
Start: 2023-02-15 | End: 1900-01-01

## 2023-02-15 NOTE — PHYSICAL EXAM
[NL (120)] : flexion 120 degrees [NL (30)] : extension 30 degrees [Right] : right knee [5___] : quadriceps 5[unfilled]/5 [Lateral] : lateral [AP] : anteroposterior [There are no fractures, subluxations or dislocations. No significant abnormalities are seen] : There are no fractures, subluxations or dislocations. No significant abnormalities are seen [] : no ecchymosis [TWNoteComboBox7] : flexion 135 degrees

## 2023-02-15 NOTE — HISTORY OF PRESENT ILLNESS
[7] : 7 [10] : 10 [Sleep] : sleep [Rest] : rest [Meds] : meds [de-identified] : she was doing better and mis-stepped over daughter, and has pain for lat knee to her hip, se did not fall, no swelling, happened 2 days ago [] : no [FreeTextEntry1] : right leg [FreeTextEntry5] : pt states she was walking with her daughter and she kind of tripped over her and landed funny on her right leg on Monday. pt states her pain has been worsening since then [FreeTextEntry7] : from her hip/buttock into her right knee  [FreeTextEntry9] : tylenol  [de-identified] : movement

## 2023-02-15 NOTE — DISCUSSION/SUMMARY
[de-identified] : modify activities\par try OTC meds\par ice as needed\par change meds\par 02/15/2023 \par \par  RE:  SIDRA ARREDONDO \par \par Acct #- [id number] \par \par \par Attention:  Nurse Reviewer /Medical Director\par \par I am writing this letter as a medical necessity for PT program.\par Patient has tried analgesics, non-steroid anti-inflammatory agents, \par hot or cold compresses,injections of corticosteroids, etc)  which in combination or by themselves has not worked.\par Based on my patient's condition, I strongly believe that the PT is medically needed.\par  \par Thank you for your time and consideration.   \par \par poss csi if no resolution\par

## 2023-02-21 ENCOUNTER — FORM ENCOUNTER (OUTPATIENT)
Age: 53
End: 2023-02-21

## 2023-02-23 ENCOUNTER — APPOINTMENT (OUTPATIENT)
Dept: GASTROENTEROLOGY | Facility: CLINIC | Age: 53
End: 2023-02-23
Payer: COMMERCIAL

## 2023-02-23 VITALS
HEART RATE: 76 BPM | WEIGHT: 161 LBS | DIASTOLIC BLOOD PRESSURE: 78 MMHG | HEIGHT: 65 IN | SYSTOLIC BLOOD PRESSURE: 140 MMHG | BODY MASS INDEX: 26.82 KG/M2 | TEMPERATURE: 97.8 F | OXYGEN SATURATION: 97 %

## 2023-02-23 DIAGNOSIS — Z92.241 PERSONAL HISTORY OF SYSTEMIC STEROID THERAPY: ICD-10-CM

## 2023-02-23 LAB
ALBUMIN SERPL ELPH-MCNC: 4.2 G/DL
ALP BLD-CCNC: 53 U/L
ALT SERPL-CCNC: 14 U/L
ANION GAP SERPL CALC-SCNC: 12 MMOL/L
AST SERPL-CCNC: 18 U/L
BASOPHILS # BLD AUTO: 0.05 K/UL
BASOPHILS NFR BLD AUTO: 0.8 %
BILIRUB SERPL-MCNC: 0.5 MG/DL
BUN SERPL-MCNC: 12 MG/DL
CALCIUM SERPL-MCNC: 9.9 MG/DL
CHLORIDE SERPL-SCNC: 101 MMOL/L
CO2 SERPL-SCNC: 25 MMOL/L
CREAT SERPL-MCNC: 0.74 MG/DL
CRP SERPL-MCNC: <3 MG/L
EGFR: 98 ML/MIN/1.73M2
EOSINOPHIL # BLD AUTO: 0.17 K/UL
EOSINOPHIL NFR BLD AUTO: 2.6 %
GLUCOSE SERPL-MCNC: 90 MG/DL
HCT VFR BLD CALC: 43.1 %
HGB BLD-MCNC: 13.8 G/DL
IMM GRANULOCYTES NFR BLD AUTO: 0.3 %
LYMPHOCYTES # BLD AUTO: 2.2 K/UL
LYMPHOCYTES NFR BLD AUTO: 33.6 %
MAN DIFF?: NORMAL
MCHC RBC-ENTMCNC: 29.2 PG
MCHC RBC-ENTMCNC: 32 GM/DL
MCV RBC AUTO: 91.1 FL
MONOCYTES # BLD AUTO: 0.45 K/UL
MONOCYTES NFR BLD AUTO: 6.9 %
NEUTROPHILS # BLD AUTO: 3.66 K/UL
NEUTROPHILS NFR BLD AUTO: 55.8 %
PLATELET # BLD AUTO: 223 K/UL
POTASSIUM SERPL-SCNC: 4.2 MMOL/L
PROT SERPL-MCNC: 7.2 G/DL
RBC # BLD: 4.73 M/UL
RBC # FLD: 12.8 %
SODIUM SERPL-SCNC: 138 MMOL/L
VIT B12 SERPL-MCNC: 483 PG/ML
WBC # FLD AUTO: 6.55 K/UL

## 2023-02-23 PROCEDURE — 99214 OFFICE O/P EST MOD 30 MIN: CPT

## 2023-02-23 NOTE — ASSESSMENT
[FreeTextEntry1] : Crohn's disease\par Remicade maintenance\par \par Plan\par Routine blood testing as ordered including infliximab level to be drawn prior to her next infusion\par Bone density testing ordered\par Telephone follow-up\par Otherwise, office visit 6 months

## 2023-02-23 NOTE — HISTORY OF PRESENT ILLNESS
[FreeTextEntry1] : 52-year-old female\par Crohn's disease longstanding\par Remicade maintenance\par Last colonoscopy August 2022\par No active inflammation\par Single sessile serrated adenoma\par \par Patient with recent hip complaints related to prior fall\par Premenopausal, but prior corticosteroid use, no history of bone density

## 2023-02-24 LAB — 24R-OH-CALCIDIOL SERPL-MCNC: 44.2 PG/ML

## 2023-03-09 LAB
INFLIXIMAB AB SERPL-MCNC: <22 NG/ML
INFLIXIMAB SERPL-MCNC: 3.7 UG/ML

## 2023-03-29 NOTE — ED PROVIDER NOTE - ENMT, MLM
Airway patent, Nasal mucosa clear. Mouth with normal mucosa. Nasalis-Muscle-Based Myocutaneous Island Pedicle Flap Text: Using a #15 blade, an incision was made around the donor flap to the level of the nasalis muscle. Wide lateral undermining was then performed in both the subcutaneous plane above the nasalis muscle, and in a submuscular plane just above periosteum. This allowed the formation of a free nasalis muscle axial pedicle (based on the angular artery) which was still attached to the actual cutaneous flap, increasing its mobility and vascular viability. Hemostasis was obtained with pinpoint electrocoagulation. The flap was mobilized into position and the pivotal anchor points positioned and stabilized with buried interrupted sutures. Subcutaneous and dermal tissues were closed in a multilayered fashion with sutures. Tissue redundancies were excised, and the epidermal edges were apposed without significant tension and sutured with sutures.

## 2023-05-01 ENCOUNTER — NON-APPOINTMENT (OUTPATIENT)
Age: 53
End: 2023-05-01

## 2023-05-01 LAB
INFLIXIMAB AB SERPL-MCNC: <22 NG/ML
INFLIXIMAB SERPL-MCNC: 3.9 UG/ML

## 2023-05-03 ENCOUNTER — APPOINTMENT (OUTPATIENT)
Dept: CARDIOLOGY | Facility: CLINIC | Age: 53
End: 2023-05-03
Payer: COMMERCIAL

## 2023-05-03 ENCOUNTER — NON-APPOINTMENT (OUTPATIENT)
Age: 53
End: 2023-05-03

## 2023-05-03 VITALS
SYSTOLIC BLOOD PRESSURE: 130 MMHG | WEIGHT: 160 LBS | OXYGEN SATURATION: 100 % | BODY MASS INDEX: 26.66 KG/M2 | DIASTOLIC BLOOD PRESSURE: 62 MMHG | HEIGHT: 65 IN | HEART RATE: 80 BPM

## 2023-05-03 DIAGNOSIS — Z82.49 FAMILY HISTORY OF ISCHEMIC HEART DISEASE AND OTHER DISEASES OF THE CIRCULATORY SYSTEM: ICD-10-CM

## 2023-05-03 DIAGNOSIS — R00.2 PALPITATIONS: ICD-10-CM

## 2023-05-03 PROCEDURE — 99204 OFFICE O/P NEW MOD 45 MIN: CPT | Mod: 25

## 2023-05-03 PROCEDURE — 93000 ELECTROCARDIOGRAM COMPLETE: CPT

## 2023-05-03 NOTE — PHYSICAL EXAM
[Well Developed] : well developed [Well Nourished] : well nourished [No Acute Distress] : no acute distress [Normal Conjunctiva] : normal conjunctiva [Normal Venous Pressure] : normal venous pressure [No Carotid Bruit] : no carotid bruit [Normal S1, S2] : normal S1, S2 [No Rub] : no rub [No Gallop] : no gallop [Clear Lung Fields] : clear lung fields [Good Air Entry] : good air entry [No Respiratory Distress] : no respiratory distress  [Soft] : abdomen soft [Non Tender] : non-tender [No Masses/organomegaly] : no masses/organomegaly [Normal Bowel Sounds] : normal bowel sounds [Normal Gait] : normal gait [No Edema] : no edema [No Cyanosis] : no cyanosis [No Clubbing] : no clubbing [No Varicosities] : no varicosities [No Rash] : no rash [No Skin Lesions] : no skin lesions [Moves all extremities] : moves all extremities [No Focal Deficits] : no focal deficits [Normal Speech] : normal speech [Alert and Oriented] : alert and oriented [Normal memory] : normal memory [de-identified] : Soft 1/6 systolic murmur at the apex without radiation.  No click no S3 no S4.

## 2023-05-03 NOTE — REVIEW OF SYSTEMS
[Palpitations] : palpitations [Negative] : Heme/Lymph [Weight Gain (___ Lbs)] : no recent weight gain [Feeling Fatigued] : not feeling fatigued [Weight Loss (___ Lbs)] : no recent weight loss [SOB] : no shortness of breath [Dyspnea on exertion] : not dyspnea during exertion [Chest Discomfort] : no chest discomfort [Lower Ext Edema] : no extremity edema [PND] : no PND [Syncope] : no syncope [Depression] : no depression [Anxiety] : no anxiety [Under Stress] : not under stress [FreeTextEntry5] : see HPI [FreeTextEntry7] : Chron's

## 2023-05-03 NOTE — HISTORY OF PRESENT ILLNESS
[FreeTextEntry1] : May 30, 2023.  Patient is a 52-year-old woman with a 27-year history of Crohn's disease and multiple pregnancies but no cardiac history.  She was sitting at a [employer] on Sunday, April 30 which was maybe mildly emotional but not severe and suddenly felt her heart racing and pounding out of her chest.  It lasted 1 to 2 minutes and suddenly stopped.  During the palpitations she did not feel lightheaded or dizzy, no shortness of breath, no chest pain.  Never happened before or since.  She was never told of any cardiac history.  Her  who is a pediatrician thought he might of heard a murmur but she went to her primary care physician who did not hear a murmur.  He did labs and an EKG and reportedly everything was normal (records on the way).  The patient has never smoked.  Never been told of hyperlipidemia or diabetes.  Never had hypertension.  There is a family history of coronary disease with her father having bypass surgery around 65-70 and her mother having stents later in life as well.  They both had been smokers but stopped over 20 years ago and the patient herself was exposed to secondhand smoke.  No history of syncope or near syncope.  No rheumatic fever.  Her only medication is Remicade.\par She had 3 episodes of COVID but all mild, the first 1 she had monoclonal antibodies and severe fatigue.  She did have 1 episode of borderline DVT years ago.  The D-dimer done by Dr. Knutson was normal now

## 2023-07-18 NOTE — ASU PREOP CHECKLIST - VERIFY SURGICAL SITE/SIDE WITH PATIENT
[place] AMBULAT[facility] ENCOUNTER  HEMATOLOGY/ONCOLOGY OFFICE VISIT      CHIEF COMPLAINT:  Office Visit, Follow-up (3 month ), and Cancer (CLL)      Oncologic history:   Diagnosis:   CLL, relapsed. Involving 70-80% of a hypercellular bone marrow, CD5 positive, BCL1/IGH negative  1. Cytogenetics Studies:  ? Abnormal Female Karyotype on 8/1/2011: 45,XX,t(2;19)(q33;q13.3),del(3)(?p21p25),t(4;7)(q25;q22),-6,-10,+15,i(17)(q10) [1]/46,XX[19]  ? Normal Female Karyotype on 01/17/2012, post treatment: 46,XX[20]  2. FISH Studies:  · Abnormal FISH on 8/1/2011: nuc talisha (ATMx2,TP53x1)[162/200],(D12Z3,Z94I750,LAMP1)X2[200]   · Normal FISH on 1/17/2012, post treatment: nuc talisha(VIRIDIANA,D12Z3,V09F178,LAMP1,TP53)X2[200]    3. Broad Molecular Profiling -- Not Performed    Previous therapy/significant history:   1. CLL, diagnosed after patient found to have an elevated white blood cell count.    2. She underwent  bone marrow biopsy on 08/01/2011;  results revealed a CD5 positive, B-cell lymphoma, most consistent with atypical CLL involving 70-80% of a hypercellular bone marrow.  There was normal to slightly diminished trilineage hematopoiesis and the specimen was negative for the BCL1/IGH gene rearrangement by FISH, and deletion of Tp53.  The cytogenetic panel is associated with poor prognosis in CLL.     3. Cycle 1 day 1 Bendamustine plus Rituxan 9/19/2011; completed therapy on 12/14/2011.  4. Complete response with molecular remission 1/2012.  5. Maintenance Rituxan started 3/2012, completed 1/2014.   6. Underwent surveillance.  7. Patient relapsed on 9/30/2020.   8. Bone marrow biopsy on 10/5/2020 revealed CD5 positive B-cell lymphoma in the peripheral blood, and 30-40% involvement of CLL in the bone marrow.   9. CT CAP on 10/7/2020 showed stable disease compared to 2018.   10. Started treatment with Obinutuzumab on 10/27/2020. Patient had an adverse drug reaction with shortness of breath, wheezing, nausea, and head ache, following the  initiation of treatment with Obinutuzumab. Infusion was stopped, then resumed and completed 6 cycles without further symptoms.  11. Venetoclax added on cycle 1, Day 22 on 11/19/2020.   12. Completed 6 cycles of obinutuzumab on 3/23/2021.   13. Venetoclax held on 4/8/2021 due to pancreatitis. Resumed on 4/21/2021.   14. Venetoclax held on 6/23/2021 due to pancreatitis-like symptoms. Resumed on 7/19/2021.   15. Patient held Venetoclax 10/5/2022 due to COVID infection, follow up plan was made to remain off Venetoclax as she was approximately s/p 2 years of treatment with good disease control.       Current therapy:   Observation.     Treatment intent: Non-curative    HISTORY OF PRESENT ILLNESS:  Jessica Kelley is a 73 year old female who presents to the clinic today for follow up for relapsed CLL. I have reviewed their chart. For additional history, please refer to EMR and prior documentation.     She is doing OK. Patient feels her mood remains \"terrible\" but overall improved compared to prior. She conitnues Insulin 2 units for BG >150. Relates this starting when she was admitted with COVID. Additionally taking Metformin. Further gives baseline cough which she relates to smoking.     Previously quit smoking but resumed after getting back from a cruise.       MEDICATIONS AND ALLERGIES:    Current Outpatient Medications   Medication Sig Dispense Refill   • fluticasone-umeclidin-vilanterol (Trelegy Ellipta) 100-62.5-25 MCG/ACT inhaler Inhale 1 puff into the lungs daily. 3 each 3   • Acetaminophen 500 MG Cap Take 500 mg by mouth as needed (minimal to moderate). Indications: Pain, mild to moderate pain     • glipiZIDE (GLUCOTROL) 5 MG tablet Take 0.5 tablets by mouth daily (before breakfast). Indications: Type 2 Diabetes 45 tablet 4   • atenolol (TENORMIN) 25 MG tablet Take 0.5 tablets by mouth daily. Indications: High Blood Pressure Disorder 90 tablet 4   • metFORMIN (GLUCOPHAGE-XR) 500 MG 24 hr tablet 1 tab qam x 1 week  and then 2 tab qam. 180 tablet 3   • ferrous sulfate 325 (65 FE) MG tablet Take 1 tablet by mouth daily (with breakfast). Take with vitamin C 250 mg twice a day to improve absorption. 90 tablet 4   • clonazePAM (KlonoPIN) 0.5 MG tablet Take 0.5-1 tablets by mouth 2 times daily as needed for Anxiety. 30 tablet 1   • amitriptyline (ELAVIL) 25 MG tablet Take 0.5-1 tablets by mouth nightly as needed (headache). 30 tablet 6   • calcitRIOL (ROCALTROL) 0.25 MCG capsule Take 1 capsule by mouth 3 days a week. Take 1 capsule every Monday, Wednesday and Friday 12 capsule 6   • Insulin Lispro, 1 Unit Dial, (HumaLOG KwikPen) 100 UNIT/ML pen-injector Prime 2 units before each dose. For glucose less than 150 no insulin. 150 to 200 2 units, 201 to 250 3 units, 251 to 300 give 4 units, 301 to 350 6 units, 350 to 400 8 units. 401-450 give 12 units, 451-500 give 14 units and > 501 call MD 15 mL 12   • albuterol 108 (90 Base) MCG/ACT inhaler Inhale 2 puffs into the lungs Every 4 hours as needed for Shortness of Breath or Wheezing. 1 each 4   • venlafaxine XR (EFFEXOR XR) 75 MG 24 hr capsule Take 1 capsule by mouth daily. 90 capsule 4   • meclizine (ANTIVERT) 25 MG tablet Take 1 tablet by mouth 3 times daily as needed for Dizziness. 90 tablet 4   • atorvastatin (LIPITOR) 40 MG tablet Take 1 tablet by mouth daily. 90 tablet 4   • pantoprazole (PROTONIX) 40 MG tablet Take 1 tablet by mouth daily. 90 tablet 4   • Insulin Pen Needle 29G X 5MM Misc Use to inject insulin 3 times daily. Remove needle cover(s) to expose needle before injecting. 90 each 0   • docusate sodium-sennosides (SENOKOT S) 50-8.6 MG per tablet Take 1 tablet by mouth daily as needed for Constipation. 30 tablet 0   • benzonatate (TESSALON PERLES) 100 MG capsule Take 1 capsule by mouth 3 times daily as needed for Cough. 30 capsule 0   • Vitamin D, Ergocalciferol, 1.25 mg (50,000 units) capsule Take 1 capsule by mouth 1 day a week. 8 capsule 0     No current  facility-administered medications for this visit.     ALLERGIES:   Allergen Reactions   • Amlodipine SWELLING   • Bupropion Tremors   • Enalapril Other (See Comments)     Hyper K   • Hydrochlorothiazide Other (See Comments)     pancreatitis        PROBLEM LIST:  Patient Active Problem List   Diagnosis   • Essential (primary) hypertension   • CLL (chronic lymphocytic leukemia) (CMS/HCC)   • Diverticular disease of colon   • Pure hypercholesterolemia   • COPD, moderate (CMD)   • Glaucoma suspect of both eyes   • Early cataracts, bilateral   • Arthritis, lumbar spine   • Cervical spine arthritis   • Stress incontinence, female   • Hyperuricemia   • Stage 3b chronic kidney disease (CMD)   • Idiopathic acute pancreatitis without infection or necrosis   • Acute renal failure superimposed on stage 3 chronic kidney disease (CMD)   • Gastroesophageal reflux disease with esophagitis without hemorrhage   • Type 2 diabetes mellitus with stage 3b chronic kidney disease, without long-term current use of insulin (CMD)   • Chronic anemia   • Meningioma (CMD)   • Iron deficiency anemia   • History of vitamin D deficiency   • History of 2019 novel coronavirus disease (COVID-19)   • Anemia of chronic disease   • Other mixed anxiety disorders   • Major depressive disorder, recurrent episode, mild (CMD)       HISTORIES:  Past medical history, surgical history, family history, and social history were reviewed and updated.    REVIEW OF SYSTEMS:    Comprehensive review of system completed as per nursing assessment and verified by me.   GENERAL:  Patient denies headache, fevers, chills, night sweats, excessive fatigue, change in appetite, weight loss, dizziness  ALLERGIC/IMMUNOLOGIC: Verified allergies: Yes  EYES:  Patient denies significant visual difficulties, double vision, blurred vision  ENT/MOUTH: Patient denies problems with hearing, sore throat, sinus drainage, mouth sores  ENDOCRINE:  Patient denies hormone replacement, hot  flashes, but complains of: diabetes  HEMATOLOGIC/LYMPHATIC: Patient denies easy bruising, bleeding, tender lymph nodes, swollen lymph nodes  BREASTS: Patient denies abnormal masses of breast, nipple discharge, pain  RESPIRATORY:  Patient denies lung pain with breathing,coughing up blood, shortness of breath, complains of cough   CARDIOVASCULAR:  Patient denies anginal chest pain, palpitations, shortness of breath when lying flat, peripheral edema  GASTROINTESTINAL: Patient denies abdominal pain , nausea, vomiting, diarrhea, GI bleeding, constipation, change in bowel habits, heartburn, sensation of feeling full, difficulty swallowing  : Patient denies abnormal genital masses, blood in the urine, frequency, urgency, burning with urination, hesitancy, incontinence, vaginal bleeding, discharge  MUSCULOSKELETAL:  Patient denies joint pain, bone pain, joint swelling, redness, decreased range of motion  SKIN:  Patient denies chronic rashes, inflammation, ulcerations, skin changes, itching  NEUROLOGIC:  Patient denies loss of balance, areas of focal weakness, abnormal gait, sensory problems, numbness, tingling  PSYCHIATRIC: Patient denies insomnia, depression, but complains of: anxiety     This patient reported abnormal symptoms that needed immediate verbal communication: Yes, these symptoms included see above and were reported to provider.      PHYSICAL EXAM:  Vital Signs:  Visit Vitals  BP (!) 145/69 (BP Location: RUE - Right upper extremity, Patient Position: Sitting, Cuff Size: Regular)   Pulse (!) 56   Temp 97.6 °F (36.4 °C) (Temporal)   Resp 18   Ht 5' 4\" (1.626 m)   Wt 81.1 kg (178 lb 12.8 oz)   SpO2 97%   BMI 30.69 kg/m²    Wt Readings from Last 10 Encounters:   07/18/23 81.1 kg (178 lb 12.8 oz)   06/06/23 79.4 kg (175 lb)   05/10/23 79.8 kg (175 lb 14.4 oz)   05/09/23 78.5 kg (173 lb)   04/27/23 78.5 kg (173 lb)   04/19/23 78.5 kg (173 lb)   04/19/23 78.5 kg (173 lb 1 oz)   04/07/23 74.8 kg (165 lb)   03/29/23  79.5 kg (175 lb 3.2 oz)   03/27/23 79.4 kg (175 lb)        ECOG [07/18/23 1121]   ECOG Performance Status 0     General: The patient is alert and oriented to place and time, well-developed, well-nourished, and in no distress.   Skin: Warm, normal color, without rash.   HEENT: Head is atraumatic, nomocephalic. No scleral icterus. Conjunctiva are without injection.   Cardiovascular: Regular rate and rhythm, no murmurs, gallops or rubs.  Respiratory: Normal respiratory effort. No wheezes or rhonchi.  Abdomen: Abdomen is soft and non-tender. There is no detected hepatosplenomegaly, masses, or ascites.  Back: No deformities or tenderness of the spine.  Extremities: No edema.   Lymph: No cervical, supraclavicular, axillary or inguinal lymphadenopathy.  Neurologic: No gross focal deficits. Cranial nerves grossly intact.   Psychiatric: Cooperative. Appropriate mood and affect.       LABORATORY DATA:  Recent Results (from the past 336 hour(s))   Lactate Dehydrogenase    Collection Time: 07/18/23 11:20 AM   Result Value Ref Range    LD, Total 187 82 - 240 Units/L   Comprehensive Metabolic Panel    Collection Time: 07/18/23 11:20 AM   Result Value Ref Range    Fasting Status      Sodium 141 135 - 145 mmol/L    Potassium 4.4 3.4 - 5.1 mmol/L    Chloride 111 (H) 97 - 110 mmol/L    Carbon Dioxide 27 21 - 32 mmol/L    Anion Gap 7 7 - 19 mmol/L    Glucose 136 (H) 70 - 99 mg/dL    BUN 34 (H) 6 - 20 mg/dL    Creatinine 1.30 (H) 0.51 - 0.95 mg/dL    Glomerular Filtration Rate 44 (L) >=60    BUN/Cr 26 (H) 7 - 25    Calcium 9.1 8.4 - 10.2 mg/dL    Bilirubin, Total 0.3 0.2 - 1.0 mg/dL    GOT/AST 10 <=37 Units/L    GPT/ALT 21 <64 Units/L    Alkaline Phosphatase 76 45 - 117 Units/L    Albumin 3.4 (L) 3.6 - 5.1 g/dL    Protein, Total 6.8 6.4 - 8.2 g/dL    Globulin 3.4 2.0 - 4.0 g/dL    A/G Ratio 1.0 1.0 - 2.4   CBC with Automated Differential (performable only)    Collection Time: 07/18/23 11:20 AM   Result Value Ref Range    WBC 4.3 4.2 -  11.0 K/mcL    RBC 3.19 (L) 4.00 - 5.20 mil/mcL    HGB 10.5 (L) 12.0 - 15.5 g/dL    HCT 30.4 (L) 36.0 - 46.5 %    MCV 95.3 78.0 - 100.0 fl    MCH 32.9 26.0 - 34.0 pg    MCHC 34.5 32.0 - 36.5 g/dL    RDW-CV 12.4 11.0 - 15.0 %    RDW-SD 43.1 39.0 - 50.0 fL     140 - 450 K/mcL    NRBC 0 <=0 /100 WBC    Neutrophil, Percent 62 %    Lymphocytes, Percent 23 %    Mono, Percent 8 %    Eosinophils, Percent 6 %    Basophils, Percent 1 %    Immature Granulocytes 0 %    Analyzer ANC 2.7 1.8 - 7.7 K/mcl    Absolute Neutrophils 2.7 1.8 - 7.7 K/mcL    Absolute Lymphocytes 1.0 1.0 - 4.0 K/mcL    Absolute Monocytes 0.3 0.3 - 0.9 K/mcL    Absolute Eosinophils  0.3 0.0 - 0.5 K/mcL    Absolute Basophils 0.0 0.0 - 0.3 K/mcL    Absolute Immature Granulocytes 0.0 0.0 - 0.2 K/mcL         IMAGING STUDIES:  No new results performed for today's visit.     ASSESSMENT:  CLL, relapsed  Status post treatment with Bendamustine plus Rituxan from 9/19/2011 - 12/14/2011. CR with molecular remission since 1/2012. Status post maintenance Rituxan from 3/2012 - 1/2014. Patient had completed 8.5 years on surveillance without evidence of recurrence. There was laboratory evidence of recurrence on 9/30/2020. Bone marrow biopsy on 10/5/2020 revealed CD5 positive B-cell lymphoma in the peripheral blood, and 30-40% involvement of CLL in the bone marrow. CT CAP on 10/7/2020 was negative for lymphadenopathy. Discussed treatment options including Ibrutinib versus Obinutuzumab with Venetoclax versus possible clinical trial with all three. Started treatment with Obinutuzumab and Venetoclax on 10/27/2020. Patient had an adverse drug reaction following the initiation of treatment with Obinutuzumab. Infusion was stopped, then resumed and completed without further symptoms. Completed 6 cycles of Obinutuzumab on 3/23/2021. Venetoclax 400 mg po daily held on 4/8/2021 due to pancreatitis. Resumed on 4/21/2021. Venetoclax held on 6/23/2021 due to acute pancreatitis.  She was hospitalized for this from 6/31-7/2/2021. Established with GI. Hydrochlorothiazide has been discontinued as the probable cause. Symptoms resolved. Resumed on 7/19/2021.   Flow cytometry which showed MRD negative disease, recommended to complete 24 months of treatment as she is tolerating well.   Patient held Venetoclax 10/5/2022 due to COVID infection, follow up plan was made to remain off Venetoclax as she was approximately s/p 2 years of treatment with good disease control.     Patient doing well clinically.   All labs from today reviewed. Hgb is stable at 10.5. Kidney function is slightly improved with creatinine of 1.30. Albumin alos mildly low at 3.4. Other counts are stable.   Patient remains without clinical or serologic evidence of progressive disease. Advised continuing on observation.   Advised maintaining a well-balanced diet with adequate fluid intake and staying active as able.   Will plan to repeat labs for CBC, CMP and LDH in 3 months.   Follow up in about 6 months for MD visit with labs.  Reviewed signs and symptoms which to monitor for and to contact the clinic if any issues arise.      Meningioma, based on imaging characteristics   Located within anterior inferior right frontal lobe.   Currently monitoring.   Recent MRI postponed due to kidney dysfunction.   Her kidney function is improved today. Reviewed with her that it may be better to get MRI done sooner as we will know if there is anything else that would be concerning.   She wants to hold off on it for now but her  wanted to get it done sooner. They will discuss and call her PCP once they decide to get it done.     Active Smoking  Counseled approximately 3 minutes on smoking cessation.   Patient will be discussing taking Chantix with PCP.     Hypothyroidism   Patient currently taking levothyroxine 50 mcg.     HTN   Blood pressure high today.   Likely secondary to stress and anxiety.   Recommended monitoring blood pressure at  home.     Anemia  Worsened likely secondary to acute inflammation.   Was started on oral iron which has been causing constipation.   Recommended to take iron pills every other day which should help with reducing the risk of constipation but still have good outcome.     Constipation secondary to oral iron  Advised using stool softeners.       Hyperglycemia, Diabetes   Patient currently on Glipizide 2.5 mg q AM, Metformin 1000 mg q AM, and Insulin 2 units along with sliding scale beginning for BG level of 150.         Rationale for proposed plan of care discussed. Patient stated understanding and agreement. Questions were answered. Follow-up was arranged. A shadowing MD was present during today's visit.     Follow up:   Return for labs in 3 months for CBC, Cmp and LDH.   Return for visit with me in 6 months with same labs prior .         There are no Patient Instructions on file for this visit.     A copy of this note was sent to her Alfa Bowers MD Amir Bista, MD  7/18/2023     This chart was documented by Constantin Garcia, acting as a scribe for Adam Kenney MD. 7/18/2023, 11:45 AM.      The documentation recorded by the scribe accurately and completely reflects the service(s) I personally performed and the decisions made by me.   Adam Kenney MD         left  3rd finger

## 2023-08-15 RX ORDER — INFLIXIMAB-DYYB 100 MG/10ML
100 INJECTION, POWDER, LYOPHILIZED, FOR SOLUTION INTRAVENOUS
Qty: 3 | Refills: 5 | Status: ACTIVE | COMMUNITY
Start: 2021-09-30 | End: 1900-01-01

## 2024-01-11 ENCOUNTER — APPOINTMENT (OUTPATIENT)
Dept: GASTROENTEROLOGY | Facility: CLINIC | Age: 54
End: 2024-01-11
Payer: COMMERCIAL

## 2024-01-11 VITALS
HEIGHT: 65 IN | BODY MASS INDEX: 24.49 KG/M2 | HEART RATE: 78 BPM | SYSTOLIC BLOOD PRESSURE: 112 MMHG | DIASTOLIC BLOOD PRESSURE: 72 MMHG | WEIGHT: 147 LBS | OXYGEN SATURATION: 98 %

## 2024-01-11 DIAGNOSIS — K50.00 CROHN'S DISEASE OF SMALL INTESTINE W/OUT COMPLICATIONS: ICD-10-CM

## 2024-01-11 PROCEDURE — 99214 OFFICE O/P EST MOD 30 MIN: CPT

## 2024-01-11 RX ORDER — SODIUM SULFATE, MAGNESIUM SULFATE, AND POTASSIUM CHLORIDE 17.75; 2.7; 2.25 G/1; G/1; G/1
1479-225-188 TABLET ORAL
Qty: 1 | Refills: 0 | Status: ACTIVE | COMMUNITY
Start: 2024-01-11 | End: 1900-01-01

## 2024-01-11 NOTE — HISTORY OF PRESENT ILLNESS
[FreeTextEntry1] : 53-year-old female Longstanding Crohn's disease, stable on infliximab home infusions Last colonoscopy with adenomatous polyp August 2022 No gross inflammation, but lymphoid aggregates on colon biopsies Patient overall with stable bowel habit though has been seeing some bright red blood periodically with bowel movement Painless  Some recent weight loss which she attributes to improve diet, less carbohydrates

## 2024-01-11 NOTE — ASSESSMENT
[FreeTextEntry1] : Well-controlled Crohn's complaints Suspect bright red blood from anal fissure or internal hemorrhoids Cannot rule out active colitis or neoplasm Plan Continue infliximab Blood testing immediately prior to next infusion Indications risks benefits and alternatives to colonoscopy for evaluation for bleeding reviewed.  She is agreeable.

## 2024-01-30 LAB
ALBUMIN SERPL ELPH-MCNC: 3.9 G/DL
ALP BLD-CCNC: 53 U/L
ALT SERPL-CCNC: 21 U/L
ANION GAP SERPL CALC-SCNC: 9 MMOL/L
AST SERPL-CCNC: 25 U/L
BILIRUB SERPL-MCNC: 0.3 MG/DL
BUN SERPL-MCNC: 14 MG/DL
CALCIUM SERPL-MCNC: 9 MG/DL
CHLORIDE SERPL-SCNC: 104 MMOL/L
CO2 SERPL-SCNC: 27 MMOL/L
CREAT SERPL-MCNC: 0.74 MG/DL
EGFR: 98 ML/MIN/1.73M2
HCT VFR BLD CALC: 40.3 %
HGB BLD-MCNC: 13.5 G/DL
MCHC RBC-ENTMCNC: 29.8 PG
MCHC RBC-ENTMCNC: 33.5 GM/DL
MCV RBC AUTO: 89 FL
PLATELET # BLD AUTO: 218 K/UL
POTASSIUM SERPL-SCNC: 4.1 MMOL/L
PROT SERPL-MCNC: 6.6 G/DL
RBC # BLD: 4.53 M/UL
RBC # FLD: 13.2 %
SODIUM SERPL-SCNC: 140 MMOL/L
WBC # FLD AUTO: 5.45 K/UL

## 2024-02-05 ENCOUNTER — TRANSCRIPTION ENCOUNTER (OUTPATIENT)
Age: 54
End: 2024-02-05

## 2024-02-05 ENCOUNTER — OUTPATIENT (OUTPATIENT)
Dept: OUTPATIENT SERVICES | Facility: HOSPITAL | Age: 54
LOS: 1 days | End: 2024-02-05
Payer: COMMERCIAL

## 2024-02-05 ENCOUNTER — APPOINTMENT (OUTPATIENT)
Dept: GASTROENTEROLOGY | Facility: HOSPITAL | Age: 54
End: 2024-02-05

## 2024-02-05 ENCOUNTER — RESULT REVIEW (OUTPATIENT)
Age: 54
End: 2024-02-05

## 2024-02-05 VITALS
HEIGHT: 65 IN | RESPIRATION RATE: 18 BRPM | DIASTOLIC BLOOD PRESSURE: 58 MMHG | HEART RATE: 68 BPM | TEMPERATURE: 98 F | SYSTOLIC BLOOD PRESSURE: 104 MMHG | OXYGEN SATURATION: 100 % | WEIGHT: 147.05 LBS

## 2024-02-05 VITALS
RESPIRATION RATE: 18 BRPM | OXYGEN SATURATION: 100 % | HEART RATE: 67 BPM | DIASTOLIC BLOOD PRESSURE: 72 MMHG | SYSTOLIC BLOOD PRESSURE: 120 MMHG

## 2024-02-05 DIAGNOSIS — K50.00 CROHN'S DISEASE OF SMALL INTESTINE WITHOUT COMPLICATIONS: ICD-10-CM

## 2024-02-05 DIAGNOSIS — Z90.49 ACQUIRED ABSENCE OF OTHER SPECIFIED PARTS OF DIGESTIVE TRACT: Chronic | ICD-10-CM

## 2024-02-05 DIAGNOSIS — S37.69XA OTHER INJURY OF UTERUS, INITIAL ENCOUNTER: Chronic | ICD-10-CM

## 2024-02-05 DIAGNOSIS — K56.609 UNSPECIFIED INTESTINAL OBSTRUCTION, UNSPECIFIED AS TO PARTIAL VERSUS COMPLETE OBSTRUCTION: Chronic | ICD-10-CM

## 2024-02-05 LAB
INFLIXIMAB AB SERPL-MCNC: <22 NG/ML
INFLIXIMAB SERPL-MCNC: 4.4 UG/ML

## 2024-02-05 PROCEDURE — 88305 TISSUE EXAM BY PATHOLOGIST: CPT | Mod: 26

## 2024-02-05 PROCEDURE — 45385 COLONOSCOPY W/LESION REMOVAL: CPT

## 2024-02-05 PROCEDURE — 88305 TISSUE EXAM BY PATHOLOGIST: CPT

## 2024-02-05 PROCEDURE — 45385 COLONOSCOPY W/LESION REMOVAL: CPT | Mod: PT

## 2024-02-05 DEVICE — NET RETRV ROT ROTH 2.5MMX230CM: Type: IMPLANTABLE DEVICE | Status: FUNCTIONAL

## 2024-02-05 RX ORDER — INFLIXIMAB-DYYB 120 MG/ML
0 INJECTION SUBCUTANEOUS
Qty: 0 | Refills: 0 | DISCHARGE

## 2024-02-05 NOTE — PRE-ANESTHESIA EVALUATION ADULT - NSATTENDATTESTRD_GEN_ALL_CORE
Heart Rate: 72

RR Interval: 833

P-R Interval: 172

QRSD Interval: 96

QT Interval: 412

QTC Interval: 451

P Axis: 63

QRS Axis: 23

T Wave Axis: 52

EKG Severity - NORMAL ECG -

EKG Impression: SINUS RHYTHM

Electronically Signed By: Leeann Grewal 30-Oct-2017 20:46:37 The patient has been re-examined and I agree with the above assessment or I updated with my findings.

## 2024-02-05 NOTE — ASU PATIENT PROFILE, ADULT - FALL HARM RISK - CONCLUSION
Painful Urination (Dysuria): Care Instructions  Your Care Instructions  Burning pain with urination (dysuria) is a common symptom of a urinary tract infection or other urinary problems. The bladder may become inflamed. This can cause pain when the bladder fills and empties. You may also feel pain if the tube that carries urine from the bladder to the outside of the body (urethra) gets irritated or infected. Sexually transmitted infections (STIs) also may cause pain when you urinate. Sometimes the pain can be caused by things other than an infection. The urethra can be irritated by soaps, perfumes, or foreign objects in the urethra. Kidney stones can cause pain when they pass through the urethra. The cause may be hard to find. You may need tests. Treatment for painful urination depends on the cause. Follow-up care is a key part of your treatment and safety. Be sure to make and go to all appointments, and call your doctor if you are having problems. It's also a good idea to know your test results and keep a list of the medicines you take. How can you care for yourself at home? · Drink extra water for the next day or two. This will help make the urine less concentrated. (If you have kidney, heart, or liver disease and have to limit fluids, talk with your doctor before you increase the amount of fluids you drink.)  · Avoid drinks that are carbonated or have caffeine. They can irritate the bladder. · Urinate often. Try to empty your bladder each time. For women:  · Urinate right after you have sex. · After going to the bathroom, wipe from front to back. · Avoid douches, bubble baths, and feminine hygiene sprays. And avoid other feminine hygiene products that have deodorants. When should you call for help? Call your doctor now or seek immediate medical care if:  · You have new symptoms, such as fever, nausea, or vomiting. · You have new or worse symptoms of a urinary problem.  For example:  ¨ You have blood or pus in your urine. ¨ You have chills or body aches. ¨ It hurts worse to urinate. ¨ You have groin or belly pain. ¨ You have pain in your back just below your rib cage (the flank area). Watch closely for changes in your health, and be sure to contact your doctor if you have any problems. Where can you learn more? Go to http://melva-patt.info/. Enter V908 in the search box to learn more about \"Painful Urination (Dysuria): Care Instructions. \"  Current as of: November 28, 2016  Content Version: 11.2  © 8292-4720 [employer]. Care instructions adapted under license by [employer] (which disclaims liability or warranty for this information). If you have questions about a medical condition or this instruction, always ask your healthcare professional. [address] any warranty or liability for your use of this information. Shoulder Pain: Care Instructions  Your Care Instructions    You can hurt your shoulder by using it too much during an activity, such as fishing or baseball. It can also happen as part of the everyday wear and tear of getting older. Shoulder injuries can be slow to heal, but your shoulder should get better with time. Your doctor may recommend a sling to rest your shoulder. If you have injured your shoulder, you may need testing and treatment. Follow-up care is a key part of your treatment and safety. Be sure to make and go to all appointments, and call your doctor if you are having problems. It's also a good idea to know your test results and keep a list of the medicines you take. How can you care for yourself at home? · Take pain medicines exactly as directed. ¨ If the doctor gave you a prescription medicine for pain, take it as prescribed. ¨ If you are not taking a prescription pain medicine, ask your doctor if you can take an over-the-counter medicine.   ¨ Do not take two or more pain medicines at the same time unless the doctor told you to. Many pain medicines contain acetaminophen, which is Tylenol. Too much acetaminophen (Tylenol) can be harmful. · If your doctor recommends that you wear a sling, use it as directed. Do not take it off before your doctor tells you to. · Put ice or a cold pack on the sore area for 10 to 20 minutes at a time. Put a thin cloth between the ice and your skin. · If there is no swelling, you can put moist heat, a heating pad, or a warm cloth on your shoulder. Some doctors suggest alternating between hot and cold. · Rest your shoulder for a few days. If your doctor recommends it, you can then begin gentle exercise of the shoulder, but do not lift anything heavy. When should you call for help? Call 911 anytime you think you may need emergency care. For example, call if:  · You have chest pain or pressure. This may occur with:  ¨ Sweating. ¨ Shortness of breath. ¨ Nausea or vomiting. ¨ Pain that spreads from the chest to the neck, jaw, or one or both shoulders or arms. ¨ Dizziness or lightheadedness. ¨ A fast or uneven pulse. After calling 911, chew 1 adult-strength aspirin. Wait for an ambulance. Do not try to drive yourself. · Your arm or hand is cool or pale or changes color. Call your doctor now or seek immediate medical care if:  · You have signs of infection, such as:  ¨ Increased pain, swelling, warmth, or redness in your shoulder. ¨ Red streaks leading from a place on your shoulder. ¨ Pus draining from an area of your shoulder. ¨ Swollen lymph nodes in your neck, armpits, or groin. ¨ A fever. Watch closely for changes in your health, and be sure to contact your doctor if:  · You cannot use your shoulder. · Your shoulder does not get better as expected. Where can you learn more? Go to http://melva-patt.info/. Enter L127 in the search box to learn more about \"Shoulder Pain: Care Instructions. \"  Current as of: May 23, 2016  Content Version: 11.2  © 6997-1671 [employer]. Care instructions adapted under license by [employer] (which disclaims liability or warranty for this information). If you have questions about a medical condition or this instruction, always ask your healthcare professional. [address] any warranty or liability for your use of this information. Universal Safety Interventions

## 2024-02-05 NOTE — ASU PATIENT PROFILE, ADULT - FALL HARM RISK - UNIVERSAL INTERVENTIONS
Quality 111:Pneumonia Vaccination Status For Older Adults: Pneumococcal Vaccination not Administered or Previously Received, Reason not Otherwise Specified Detail Level: Simple Additional Notes: Pt does not opt for the vaccine. Quality 130: Documentation Of Current Medications In The Medical Record: Current Medications Documented Bed in lowest position, wheels locked, appropriate side rails in place/Call bell, personal items and telephone in reach/Instruct patient to call for assistance before getting out of bed or chair/Non-slip footwear when patient is out of bed/Bath to call system/Physically safe environment - no spills, clutter or unnecessary equipment/Purposeful Proactive Rounding/Room/bathroom lighting operational, light cord in reach

## 2024-02-05 NOTE — PRE PROCEDURE NOTE - PRE PROCEDURE EVALUATION
Attending Physician:        Ignacio Ward MD                    Procedure:    Indication for Procedure: crohn's surveillance  ________________________________________________________  PAST MEDICAL & SURGICAL HISTORY:  Crohn disease      History of appendectomy      Bowel obstruction      Uterine rupture        ALLERGIES:  Allergy Status Unknown    HOME MEDICATIONS:  Inflectra 100 mg intravenous injection:     AICD/PPM: [ ] yes   [x ] no    PERTINENT LAB DATA:                      PHYSICAL EXAMINATION:    T(C): --  HR: --  BP: --  RR: --  SpO2: --    Constitutional: NAD  HEENT: PERRLA, EOMI,    Neck:  No JVD  Respiratory: CTAB/L  Cardiovascular: S1 and S2  Gastrointestinal: BS+, soft, NT/ND  Extremities: No peripheral edema  Neurological: A/O x 3, no focal deficits  Psychiatric: Normal mood, normal affect  Skin: No rashes    ASA Class: I [ ]  II [x ]  III [ ]  IV [ ]    COMMENTS:    The patient is a suitable candidate for the planned procedure unless box checked [ ]  No, explain:

## 2024-02-07 ENCOUNTER — NON-APPOINTMENT (OUTPATIENT)
Age: 54
End: 2024-02-07

## 2024-02-07 PROBLEM — S37.69XA OTHER INJURY OF UTERUS, INITIAL ENCOUNTER: Chronic | Status: ACTIVE | Noted: 2024-02-05

## 2024-02-07 LAB — SURGICAL PATHOLOGY STUDY: SIGNIFICANT CHANGE UP

## 2024-02-17 ENCOUNTER — TRANSCRIPTION ENCOUNTER (OUTPATIENT)
Age: 54
End: 2024-02-17

## 2024-02-27 ENCOUNTER — APPOINTMENT (OUTPATIENT)
Dept: COLORECTAL SURGERY | Facility: CLINIC | Age: 54
End: 2024-02-27
Payer: COMMERCIAL

## 2024-02-27 VITALS
BODY MASS INDEX: 24.49 KG/M2 | HEART RATE: 66 BPM | OXYGEN SATURATION: 99 % | SYSTOLIC BLOOD PRESSURE: 131 MMHG | DIASTOLIC BLOOD PRESSURE: 86 MMHG | TEMPERATURE: 97.4 F | HEIGHT: 65 IN | WEIGHT: 147 LBS

## 2024-02-27 DIAGNOSIS — K64.8 OTHER HEMORRHOIDS: ICD-10-CM

## 2024-02-27 DIAGNOSIS — K62.5 HEMORRHAGE OF ANUS AND RECTUM: ICD-10-CM

## 2024-02-27 PROCEDURE — 46600 DIAGNOSTIC ANOSCOPY SPX: CPT

## 2024-02-27 PROCEDURE — 99204 OFFICE O/P NEW MOD 45 MIN: CPT | Mod: 25

## 2024-02-27 RX ORDER — HYDROCORTISONE 2.5% 25 MG/G
2.5 CREAM TOPICAL
Qty: 1 | Refills: 3 | Status: ACTIVE | COMMUNITY
Start: 2024-02-27 | End: 1900-01-01

## 2024-02-27 NOTE — HISTORY OF PRESENT ILLNESS
[FreeTextEntry1] : 54 y/o F with a h/o Crohn's well maintained on Infliximab presents with several months of intermittent painless rectal bleeding. She changed her diet several months ago to include more fiber and has some fluctuation in her stool consistency but overall reports normal soft BMs. She has not tried any tx yet for the bleeding. Last colonoscopy was earlier this month and revealed no evidence of proctitis, only internal hemorrhoids.    x7

## 2024-02-27 NOTE — PHYSICAL EXAM
[Excoriation] : no perianal excoriation [Fistula] : no fistulas [Wart] : no warts [Ulcer ___ cm] : no ulcers [Normal] : was normal [None] : there was no rectal mass  [Normal Breath Sounds] : Normal breath sounds [Wheezing] : no wheezing was heard [Normal Heart Sounds] : normal heart sounds [Normal Rate and Rhythm] : normal rate and rhythm [Alert] : alert [Oriented to Person] : oriented to person [Oriented to Place] : oriented to place [Oriented to Time] : oriented to time [Calm] : calm [de-identified] : soft, NT/ND [de-identified] : small external hemorrhoids, mild skin irritation at the anal verge [de-identified] : anoscopy reveals mildly enlarged internal hemorrhoids [de-identified] : NAD [de-identified] : supple [de-identified] : NCAT [de-identified] : MATTHEW [de-identified] : warm

## 2024-02-27 NOTE — CONSULT LETTER
[Dear  ___] : Dear  [unfilled], [Courtesy Letter:] : I had the pleasure of seeing your patient, [unfilled], in my office today. [Please see my note below.] : Please see my note below. [Sincerely,] : Sincerely, [FreeTextEntry3] : Mihai Jimenes MD, FACS, FASCRS Colorectal Surgery [facility] Assistant Professor of Surgery [employer] at [address], [address] [place] [address] Tel: [phone]  Cell: [phone]  Email: [email or web address]

## 2024-02-27 NOTE — ASSESSMENT
[FreeTextEntry1] : We discussed the options for hemorrhoids including topical treatment, office procedures (namely rubber band ligation), and operative intervention (THD vs hemorrhoidectomy). The r/b/a of the procedures were discussed including but not limited to pain, bleeding, and infection. Cont high fiber diet will start with Proctozone and she will f/u PRN if bleeding persists.

## 2024-05-14 ENCOUNTER — NON-APPOINTMENT (OUTPATIENT)
Age: 54
End: 2024-05-14

## 2024-06-04 ENCOUNTER — APPOINTMENT (OUTPATIENT)
Dept: ORTHOPEDIC SURGERY | Facility: CLINIC | Age: 54
End: 2024-06-04
Payer: COMMERCIAL

## 2024-06-04 VITALS — HEIGHT: 65 IN | BODY MASS INDEX: 24.49 KG/M2 | WEIGHT: 147 LBS

## 2024-06-04 DIAGNOSIS — M18.11 UNILATERAL PRIMARY OSTEOARTHRITIS OF FIRST CARPOMETACARPAL JOINT, RIGHT HAND: ICD-10-CM

## 2024-06-04 PROCEDURE — 20550 NJX 1 TENDON SHEATH/LIGAMENT: CPT | Mod: F5

## 2024-06-04 PROCEDURE — 73130 X-RAY EXAM OF HAND: CPT | Mod: RT

## 2024-06-04 PROCEDURE — 99214 OFFICE O/P EST MOD 30 MIN: CPT | Mod: 25

## 2024-06-04 NOTE — IMAGING
[de-identified] : right thumb base with shoulder deformity at the [facility].  TTP and positive basal grind at the [facility]. median/ulnar/radial serve sensation intact ain/pin/ulnar motor intact palpable pulsres CR<2s full active range of motion otherwise ttp over A1 pulley  +triggering  xrays 3 views right hand taken today show 1st [facility] OA

## 2024-06-18 ENCOUNTER — APPOINTMENT (OUTPATIENT)
Dept: ORTHOPEDIC SURGERY | Facility: CLINIC | Age: 54
End: 2024-06-18

## 2024-06-27 ENCOUNTER — APPOINTMENT (OUTPATIENT)
Dept: RHEUMATOLOGY | Facility: CLINIC | Age: 54
End: 2024-06-27
Payer: COMMERCIAL

## 2024-06-27 VITALS — BODY MASS INDEX: 25.16 KG/M2 | HEIGHT: 65 IN | WEIGHT: 151 LBS | HEART RATE: 70 BPM | OXYGEN SATURATION: 96 %

## 2024-06-27 DIAGNOSIS — M65.311 TRIGGER THUMB, RIGHT THUMB: ICD-10-CM

## 2024-06-27 DIAGNOSIS — M65.341 TRIGGER FINGER, RIGHT RING FINGER: ICD-10-CM

## 2024-06-27 PROCEDURE — 99214 OFFICE O/P EST MOD 30 MIN: CPT

## 2024-06-27 PROCEDURE — G2211 COMPLEX E/M VISIT ADD ON: CPT

## 2024-06-28 PROBLEM — M65.341 ACQUIRED TRIGGER FINGER OF RIGHT RING FINGER: Status: ACTIVE | Noted: 2023-02-06

## 2024-06-28 PROBLEM — M65.311 TRIGGER FINGER OF RIGHT THUMB: Status: ACTIVE | Noted: 2024-06-04

## 2024-08-08 ENCOUNTER — APPOINTMENT (OUTPATIENT)
Dept: GASTROENTEROLOGY | Facility: CLINIC | Age: 54
End: 2024-08-08

## 2024-09-06 ENCOUNTER — APPOINTMENT (OUTPATIENT)
Dept: ORTHOPEDIC SURGERY | Facility: CLINIC | Age: 54
End: 2024-09-06

## 2024-09-26 ENCOUNTER — APPOINTMENT (OUTPATIENT)
Dept: RHEUMATOLOGY | Facility: CLINIC | Age: 54
End: 2024-09-26

## 2024-11-14 ENCOUNTER — APPOINTMENT (OUTPATIENT)
Dept: GASTROENTEROLOGY | Facility: CLINIC | Age: 54
End: 2024-11-14
Payer: COMMERCIAL

## 2024-11-14 VITALS
DIASTOLIC BLOOD PRESSURE: 81 MMHG | HEIGHT: 65 IN | HEART RATE: 76 BPM | SYSTOLIC BLOOD PRESSURE: 131 MMHG | OXYGEN SATURATION: 97 % | WEIGHT: 148 LBS | BODY MASS INDEX: 24.66 KG/M2

## 2024-11-14 DIAGNOSIS — K50.00 CROHN'S DISEASE OF SMALL INTESTINE W/OUT COMPLICATIONS: ICD-10-CM

## 2024-11-14 PROCEDURE — 99213 OFFICE O/P EST LOW 20 MIN: CPT

## 2024-11-14 PROCEDURE — G2211 COMPLEX E/M VISIT ADD ON: CPT | Mod: NC

## 2024-11-27 DIAGNOSIS — K50.00 CROHN'S DISEASE OF SMALL INTESTINE W/OUT COMPLICATIONS: ICD-10-CM

## 2024-12-04 ENCOUNTER — NON-APPOINTMENT (OUTPATIENT)
Age: 54
End: 2024-12-04

## 2025-01-03 ENCOUNTER — APPOINTMENT (OUTPATIENT)
Dept: COLORECTAL SURGERY | Facility: CLINIC | Age: 55
End: 2025-01-03

## 2025-01-09 ENCOUNTER — APPOINTMENT (OUTPATIENT)
Dept: ORTHOPEDIC SURGERY | Facility: CLINIC | Age: 55
End: 2025-01-09

## 2025-01-28 ENCOUNTER — APPOINTMENT (OUTPATIENT)
Dept: INTERNAL MEDICINE | Facility: CLINIC | Age: 55
End: 2025-01-28

## 2025-01-28 ENCOUNTER — NON-APPOINTMENT (OUTPATIENT)
Age: 55
End: 2025-01-28

## 2025-01-28 VITALS — DIASTOLIC BLOOD PRESSURE: 86 MMHG | SYSTOLIC BLOOD PRESSURE: 128 MMHG

## 2025-01-28 VITALS
DIASTOLIC BLOOD PRESSURE: 80 MMHG | HEIGHT: 65 IN | OXYGEN SATURATION: 98 % | HEART RATE: 68 BPM | TEMPERATURE: 98 F | WEIGHT: 150 LBS | BODY MASS INDEX: 24.99 KG/M2 | SYSTOLIC BLOOD PRESSURE: 140 MMHG

## 2025-01-28 DIAGNOSIS — Z00.00 ENCOUNTER FOR GENERAL ADULT MEDICAL EXAMINATION W/OUT ABNORMAL FINDINGS: ICD-10-CM

## 2025-01-28 DIAGNOSIS — R06.83 SNORING: ICD-10-CM

## 2025-01-28 DIAGNOSIS — Z12.9 ENCOUNTER FOR SCREENING FOR MALIGNANT NEOPLASM, SITE UNSPECIFIED: ICD-10-CM

## 2025-01-28 DIAGNOSIS — K64.8 OTHER HEMORRHOIDS: ICD-10-CM

## 2025-01-28 DIAGNOSIS — K50.00 CROHN'S DISEASE OF SMALL INTESTINE W/OUT COMPLICATIONS: ICD-10-CM

## 2025-01-28 DIAGNOSIS — M51.369: ICD-10-CM

## 2025-01-28 DIAGNOSIS — Z78.9 OTHER SPECIFIED HEALTH STATUS: ICD-10-CM

## 2025-01-28 DIAGNOSIS — G43.909 MIGRAINE, UNSPECIFIED, NOT INTRACTABLE, W/OUT STATUS MIGRAINOSUS: ICD-10-CM

## 2025-01-28 DIAGNOSIS — Z13.228 ENCOUNTER FOR SCREENING FOR OTHER METABOLIC DISORDERS: ICD-10-CM

## 2025-01-28 DIAGNOSIS — Z71.85 ENCOUNTER FOR IMMUNIZATION SAFETY COUNSELING: ICD-10-CM

## 2025-01-28 DIAGNOSIS — M18.11 UNILATERAL PRIMARY OSTEOARTHRITIS OF FIRST CARPOMETACARPAL JOINT, RIGHT HAND: ICD-10-CM

## 2025-01-28 DIAGNOSIS — Z13.6 ENCOUNTER FOR SCREENING FOR CARDIOVASCULAR DISORDERS: ICD-10-CM

## 2025-01-28 PROCEDURE — 99202 OFFICE O/P NEW SF 15 MIN: CPT | Mod: 25

## 2025-01-28 PROCEDURE — 93000 ELECTROCARDIOGRAM COMPLETE: CPT

## 2025-01-28 PROCEDURE — 99386 PREV VISIT NEW AGE 40-64: CPT

## 2025-01-28 RX ORDER — METHOCARBAMOL 500 MG/1
500 TABLET, FILM COATED ORAL
Qty: 15 | Refills: 0 | Status: ACTIVE | COMMUNITY
Start: 2025-01-28 | End: 1900-01-01

## 2025-01-30 ENCOUNTER — APPOINTMENT (OUTPATIENT)
Dept: OBGYN | Facility: CLINIC | Age: 55
End: 2025-01-30

## 2025-03-10 NOTE — ASU PATIENT PROFILE, ADULT - FALLEN IN THE PAST
Dear Sandy,    Your results look really well controlled.    Kind regards,    Amita Lombardo, DO  Internal Medicine - Pediatrics Physician  [facility]      
no

## 2025-03-12 DIAGNOSIS — R82.90 UNSPECIFIED ABNORMAL FINDINGS IN URINE: ICD-10-CM

## 2025-03-12 DIAGNOSIS — E61.1 IRON DEFICIENCY: ICD-10-CM

## 2025-03-12 DIAGNOSIS — R79.89 OTHER SPECIFIED ABNORMAL FINDINGS OF BLOOD CHEMISTRY: ICD-10-CM

## 2025-03-12 RX ORDER — ERGOCALCIFEROL 1.25 MG/1
1.25 MG CAPSULE, LIQUID FILLED ORAL
Qty: 8 | Refills: 0 | Status: ACTIVE | COMMUNITY
Start: 2025-03-12 | End: 1900-01-01

## 2025-03-12 RX ORDER — CHLORHEXIDINE GLUCONATE 4 %
325 (65 FE) LIQUID (ML) TOPICAL
Qty: 90 | Refills: 1 | Status: ACTIVE | COMMUNITY
Start: 2025-03-12 | End: 1900-01-01

## 2025-04-03 ENCOUNTER — APPOINTMENT (OUTPATIENT)
Dept: OBGYN | Facility: CLINIC | Age: 55
End: 2025-04-03
Payer: COMMERCIAL

## 2025-04-03 VITALS
WEIGHT: 154 LBS | DIASTOLIC BLOOD PRESSURE: 83 MMHG | BODY MASS INDEX: 25.66 KG/M2 | HEART RATE: 65 BPM | HEIGHT: 65 IN | SYSTOLIC BLOOD PRESSURE: 145 MMHG

## 2025-04-03 DIAGNOSIS — I82.409 ACUTE EMBOLISM AND THROMBOSIS OF UNSPECIFIED DEEP VEINS OF UNSPECIFIED LOWER EXTREMITY: ICD-10-CM

## 2025-04-03 DIAGNOSIS — Z01.419 ENCOUNTER FOR GYNECOLOGICAL EXAMINATION (GENERAL) (ROUTINE) W/OUT ABNORMAL FINDINGS: ICD-10-CM

## 2025-04-03 PROCEDURE — 99386 PREV VISIT NEW AGE 40-64: CPT

## 2025-04-03 PROCEDURE — 99459 PELVIC EXAMINATION: CPT | Mod: NC

## 2025-04-03 RX ORDER — HYALURONIDASE IN NACL,ISO/PF 175UNIT/ML
VIAL (ML) INTRAOCULAR
Refills: 0 | Status: ACTIVE | COMMUNITY

## 2025-04-03 RX ORDER — ESTRADIOL 0.1 MG/G
CREAM VAGINAL
Refills: 0 | Status: ACTIVE | COMMUNITY

## 2025-04-04 LAB
APPEARANCE: CLEAR
BACTERIA: NEGATIVE /HPF
BILIRUBIN URINE: NEGATIVE
BLOOD URINE: NEGATIVE
CAST: 0 /LPF
COLOR: YELLOW
EPITHELIAL CELLS: 1 /HPF
GLUCOSE QUALITATIVE U: NEGATIVE MG/DL
KETONES URINE: NEGATIVE MG/DL
LEUKOCYTE ESTERASE URINE: NEGATIVE
MICROSCOPIC-UA: NORMAL
NITRITE URINE: NEGATIVE
PH URINE: 7.5
PROTEIN URINE: NEGATIVE MG/DL
RED BLOOD CELLS URINE: 1 /HPF
SPECIFIC GRAVITY URINE: 1.01
UROBILINOGEN URINE: 0.2 MG/DL
WHITE BLOOD CELLS URINE: 0 /HPF

## 2025-04-06 LAB — HPV HIGH+LOW RISK DNA PNL CVX: NOT DETECTED

## 2025-04-08 LAB
BACTERIA UR CULT: ABNORMAL
CYTOLOGY CVX/VAG DOC THIN PREP: ABNORMAL

## 2025-04-09 NOTE — ED ADULT NURSE NOTE - NS ED NOTE ABUSE SUSPICION NEGLECT YN
Medication: norethindrone (MICRONOR) 0.35 MG tablet  passed protocol.     Last office visit date: 5/29/2024  Next appointment scheduled?: Yes;  6/9/2025 (with pcp)  Number of refills given: 0, provider review    Genesys texted and needs provider review.   No

## 2025-06-25 ENCOUNTER — APPOINTMENT (OUTPATIENT)
Dept: COLORECTAL SURGERY | Facility: CLINIC | Age: 55
End: 2025-06-25

## 2025-07-03 ENCOUNTER — TRANSCRIPTION ENCOUNTER (OUTPATIENT)
Age: 55
End: 2025-07-03

## 2025-07-21 ENCOUNTER — APPOINTMENT (OUTPATIENT)
Dept: ULTRASOUND IMAGING | Facility: CLINIC | Age: 55
End: 2025-07-21
Payer: COMMERCIAL

## 2025-07-21 ENCOUNTER — OUTPATIENT (OUTPATIENT)
Dept: OUTPATIENT SERVICES | Facility: HOSPITAL | Age: 55
LOS: 1 days | End: 2025-07-21
Payer: COMMERCIAL

## 2025-07-21 DIAGNOSIS — S37.69XA OTHER INJURY OF UTERUS, INITIAL ENCOUNTER: Chronic | ICD-10-CM

## 2025-07-21 DIAGNOSIS — N93.9 ABNORMAL UTERINE AND VAGINAL BLEEDING, UNSPECIFIED: ICD-10-CM

## 2025-07-21 DIAGNOSIS — Z90.49 ACQUIRED ABSENCE OF OTHER SPECIFIED PARTS OF DIGESTIVE TRACT: Chronic | ICD-10-CM

## 2025-07-21 DIAGNOSIS — K56.609 UNSPECIFIED INTESTINAL OBSTRUCTION, UNSPECIFIED AS TO PARTIAL VERSUS COMPLETE OBSTRUCTION: Chronic | ICD-10-CM

## 2025-07-21 PROCEDURE — 76830 TRANSVAGINAL US NON-OB: CPT | Mod: 26

## 2025-07-21 PROCEDURE — 76856 US EXAM PELVIC COMPLETE: CPT

## 2025-07-21 PROCEDURE — 76830 TRANSVAGINAL US NON-OB: CPT

## 2025-07-28 ENCOUNTER — APPOINTMENT (OUTPATIENT)
Dept: OBGYN | Facility: CLINIC | Age: 55
End: 2025-07-28
Payer: COMMERCIAL

## 2025-07-28 PROCEDURE — 99213 OFFICE O/P EST LOW 20 MIN: CPT | Mod: 93

## 2025-07-31 ENCOUNTER — APPOINTMENT (OUTPATIENT)
Dept: ORTHOPEDIC SURGERY | Facility: CLINIC | Age: 55
End: 2025-07-31
Payer: COMMERCIAL

## 2025-07-31 VITALS — BODY MASS INDEX: 24.99 KG/M2 | WEIGHT: 150 LBS | HEIGHT: 65 IN

## 2025-07-31 DIAGNOSIS — M72.2 PLANTAR FASCIAL FIBROMATOSIS: ICD-10-CM

## 2025-07-31 DIAGNOSIS — M21.41 FLAT FOOT [PES PLANUS] (ACQUIRED), RIGHT FOOT: ICD-10-CM

## 2025-07-31 DIAGNOSIS — M62.461 CONTRACTURE OF MUSCLE, RIGHT LOWER LEG: ICD-10-CM

## 2025-07-31 PROCEDURE — 99204 OFFICE O/P NEW MOD 45 MIN: CPT

## 2025-07-31 PROCEDURE — 73630 X-RAY EXAM OF FOOT: CPT | Mod: RT

## 2025-08-05 ENCOUNTER — TRANSCRIPTION ENCOUNTER (OUTPATIENT)
Age: 55
End: 2025-08-05

## 2025-08-06 ENCOUNTER — APPOINTMENT (OUTPATIENT)
Dept: OBGYN | Facility: CLINIC | Age: 55
End: 2025-08-06
Payer: COMMERCIAL

## 2025-08-06 VITALS
BODY MASS INDEX: 25.83 KG/M2 | SYSTOLIC BLOOD PRESSURE: 138 MMHG | DIASTOLIC BLOOD PRESSURE: 83 MMHG | HEART RATE: 71 BPM | HEIGHT: 65 IN | WEIGHT: 155 LBS

## 2025-08-06 DIAGNOSIS — N95.9 UNSPECIFIED MENOPAUSAL AND PERIMENOPAUSAL DISORDER: ICD-10-CM

## 2025-08-06 DIAGNOSIS — N93.9 ABNORMAL UTERINE AND VAGINAL BLEEDING, UNSPECIFIED: ICD-10-CM

## 2025-08-06 DIAGNOSIS — Z87.828 PERSONAL HISTORY OF OTHER (HEALED) PHYSICAL INJURY AND TRAUMA: ICD-10-CM

## 2025-08-06 LAB
HCG UR QL: NEGATIVE
QUALITY CONTROL: YES

## 2025-08-06 PROCEDURE — 99459 PELVIC EXAMINATION: CPT | Mod: NC

## 2025-08-06 PROCEDURE — 58100 BIOPSY OF UTERUS LINING: CPT

## 2025-08-06 PROCEDURE — 99212 OFFICE O/P EST SF 10 MIN: CPT | Mod: 25

## 2025-08-11 PROBLEM — M21.41 ACQUIRED PES PLANUS OF RIGHT FOOT: Status: ACTIVE | Noted: 2025-08-11

## 2025-08-11 PROBLEM — M72.2 PLANTAR FASCIITIS, RIGHT: Status: ACTIVE | Noted: 2025-07-31

## 2025-08-11 PROBLEM — M62.461 GASTROCNEMIUS EQUINUS OF RIGHT LOWER EXTREMITY: Status: ACTIVE | Noted: 2025-08-11

## 2025-08-12 LAB — CORE LAB BIOPSY: NORMAL

## 2025-08-21 ENCOUNTER — APPOINTMENT (OUTPATIENT)
Dept: INTERNAL MEDICINE | Facility: CLINIC | Age: 55
End: 2025-08-21

## 2025-08-21 VITALS
TEMPERATURE: 98.2 F | BODY MASS INDEX: 25.33 KG/M2 | SYSTOLIC BLOOD PRESSURE: 112 MMHG | HEART RATE: 74 BPM | HEIGHT: 65 IN | WEIGHT: 152 LBS | OXYGEN SATURATION: 98 % | DIASTOLIC BLOOD PRESSURE: 76 MMHG

## 2025-08-21 DIAGNOSIS — R82.90 UNSPECIFIED ABNORMAL FINDINGS IN URINE: ICD-10-CM

## 2025-08-21 DIAGNOSIS — Z13.228 ENCOUNTER FOR SCREENING FOR OTHER METABOLIC DISORDERS: ICD-10-CM

## 2025-08-21 DIAGNOSIS — K50.00 CROHN'S DISEASE OF SMALL INTESTINE W/OUT COMPLICATIONS: ICD-10-CM

## 2025-08-21 DIAGNOSIS — Z02.1 ENCOUNTER FOR PRE-EMPLOYMENT EXAMINATION: ICD-10-CM

## 2025-08-21 DIAGNOSIS — R05.9 COUGH, UNSPECIFIED: ICD-10-CM

## 2025-08-21 DIAGNOSIS — M54.50 LOW BACK PAIN, UNSPECIFIED: ICD-10-CM

## 2025-08-21 DIAGNOSIS — Z11.1 ENCOUNTER FOR SCREENING FOR RESPIRATORY TUBERCULOSIS: ICD-10-CM

## 2025-08-21 PROCEDURE — G2211 COMPLEX E/M VISIT ADD ON: CPT | Mod: NC

## 2025-08-21 PROCEDURE — 99214 OFFICE O/P EST MOD 30 MIN: CPT

## 2025-08-22 LAB
ALBUMIN SERPL ELPH-MCNC: 4.1 G/DL
ALP BLD-CCNC: 61 U/L
ALT SERPL-CCNC: 12 U/L
ANION GAP SERPL CALC-SCNC: 13 MMOL/L
AST SERPL-CCNC: 17 U/L
BASOPHILS # BLD AUTO: 0.04 K/UL
BASOPHILS NFR BLD AUTO: 0.7 %
BILIRUB SERPL-MCNC: 0.3 MG/DL
BUN SERPL-MCNC: 13 MG/DL
CALCIUM SERPL-MCNC: 9.5 MG/DL
CHLORIDE SERPL-SCNC: 105 MMOL/L
CHOLEST SERPL-MCNC: 210 MG/DL
CK SERPL-CCNC: 76 U/L
CO2 SERPL-SCNC: 24 MMOL/L
CREAT SERPL-MCNC: 0.73 MG/DL
EGFRCR SERPLBLD CKD-EPI 2021: 99 ML/MIN/1.73M2
EOSINOPHIL # BLD AUTO: 0.13 K/UL
EOSINOPHIL NFR BLD AUTO: 2.4 %
ESTIMATED AVERAGE GLUCOSE: 108 MG/DL
GLUCOSE SERPL-MCNC: 75 MG/DL
HBA1C MFR BLD HPLC: 5.4 %
HCT VFR BLD CALC: 44.6 %
HDLC SERPL-MCNC: 70 MG/DL
HGB BLD-MCNC: 14.2 G/DL
IMM GRANULOCYTES NFR BLD AUTO: 0.2 %
LDLC SERPL-MCNC: 122 MG/DL
LYMPHOCYTES # BLD AUTO: 2.08 K/UL
LYMPHOCYTES NFR BLD AUTO: 38.1 %
MAN DIFF?: NORMAL
MCHC RBC-ENTMCNC: 29.5 PG
MCHC RBC-ENTMCNC: 31.8 G/DL
MCV RBC AUTO: 92.7 FL
MEV IGG FLD QL IA: 16.5 AU/ML
MEV IGG+IGM SER-IMP: POSITIVE
MONOCYTES # BLD AUTO: 0.35 K/UL
MONOCYTES NFR BLD AUTO: 6.4 %
MUV AB SER-ACNC: POSITIVE
MUV IGG SER QL IA: 20.4 AU/ML
NEUTROPHILS # BLD AUTO: 2.85 K/UL
NEUTROPHILS NFR BLD AUTO: 52.2 %
NONHDLC SERPL-MCNC: 140 MG/DL
PLATELET # BLD AUTO: 270 K/UL
POTASSIUM SERPL-SCNC: 4.5 MMOL/L
PROT SERPL-MCNC: 7.1 G/DL
RBC # BLD: 4.81 M/UL
RBC # FLD: 13.6 %
RUBV IGG FLD-ACNC: 5.06 INDEX
RUBV IGG SER-IMP: POSITIVE
SODIUM SERPL-SCNC: 142 MMOL/L
T4 FREE SERPL-MCNC: 1.2 NG/DL
TRIGL SERPL-MCNC: 100 MG/DL
TSH SERPL-ACNC: 1.48 UIU/ML
VZV AB TITR SER: POSITIVE
VZV IGG SER IF-ACNC: 6.02 S/CO
WBC # FLD AUTO: 5.46 K/UL

## 2025-08-24 LAB
M TB IFN-G BLD-IMP: NEGATIVE
QUANTIFERON TB PLUS MITOGEN MINUS NIL: >10 IU/ML
QUANTIFERON TB PLUS NIL: 0.01 IU/ML
QUANTIFERON TB PLUS TB1 MINUS NIL: 0 IU/ML
QUANTIFERON TB PLUS TB2 MINUS NIL: 0 IU/ML

## 2025-08-25 ENCOUNTER — NON-APPOINTMENT (OUTPATIENT)
Age: 55
End: 2025-08-25

## 2025-09-04 ENCOUNTER — APPOINTMENT (OUTPATIENT)
Dept: RHEUMATOLOGY | Facility: CLINIC | Age: 55
End: 2025-09-04

## (undated) DEVICE — FOLEY HOLDER STATLOCK 2 WAY ADULT

## (undated) DEVICE — SUCTION YANKAUER NO CONTROL VENT

## (undated) DEVICE — CATH IV SAFE BC 22G X 1" (BLUE)

## (undated) DEVICE — SYR LUER LOK 50CC

## (undated) DEVICE — FORCEP RADIAL JAW 4 JUMBO 2.8MM 3.2MM 240CM ORANGE DISP

## (undated) DEVICE — CLAMP BX HOT RAD JAW 3

## (undated) DEVICE — SOL INJ NS 0.9% 500ML 2 PORT

## (undated) DEVICE — BIOPSY FORCEP RADIAL JAW 4 STANDARD WITH NEEDLE

## (undated) DEVICE — TUBING SUCTION 20FT

## (undated) DEVICE — CATH IV SAFE BC 20G X 1.16" (PINK)

## (undated) DEVICE — TUBING IV SET GRAVITY 3Y 100" MACRO

## (undated) DEVICE — IRRIGATOR BIO SHIELD

## (undated) DEVICE — TUBING SUCTION CONN 6FT STERILE

## (undated) DEVICE — ELCTR GROUNDING PAD ADULT COVIDIEN

## (undated) DEVICE — BRUSH COLONOSCOPY CYTOLOGY

## (undated) DEVICE — POLY TRAP ETRAP

## (undated) DEVICE — SNARE EXACTO COLD 9MMX230CM

## (undated) DEVICE — PACK IV START WITH CHG

## (undated) DEVICE — SENSOR O2 FINGER ADULT